# Patient Record
Sex: FEMALE | Race: WHITE | Employment: STUDENT | URBAN - METROPOLITAN AREA
[De-identification: names, ages, dates, MRNs, and addresses within clinical notes are randomized per-mention and may not be internally consistent; named-entity substitution may affect disease eponyms.]

---

## 2017-03-31 ENCOUNTER — OFFICE VISIT (OUTPATIENT)
Dept: OPHTHALMOLOGY | Facility: CLINIC | Age: 22
End: 2017-03-31

## 2017-03-31 DIAGNOSIS — H04.129 DRY EYE SYNDROME OF UNSPECIFIED LACRIMAL GLAND: ICD-10-CM

## 2017-03-31 DIAGNOSIS — S04.52XS: ICD-10-CM

## 2017-03-31 DIAGNOSIS — G51.0 FACIAL NERVE PALSY: Primary | ICD-10-CM

## 2017-03-31 ASSESSMENT — VISUAL ACUITY
OS_PH_SC: 20/25+2
OD_SC: 20/20
OS_SC: 20/40
METHOD: SNELLEN - LINEAR

## 2017-03-31 ASSESSMENT — CONF VISUAL FIELD
OS_NORMAL: 1
OD_NORMAL: 1
METHOD: COUNTING FINGERS

## 2017-03-31 ASSESSMENT — EXTERNAL EXAM - LEFT EYE: OS_EXAM: 1+ BROW PTOSIS

## 2017-03-31 ASSESSMENT — TONOMETRY
OD_IOP_MMHG: 15
IOP_METHOD: TONOPEN
OS_IOP_MMHG: 12

## 2017-03-31 ASSESSMENT — MARGIN REFLEX DISTANCE
OS_MRD1: 4
OD_MRD1: 4

## 2017-03-31 ASSESSMENT — SLIT LAMP EXAM - LIDS
COMMENTS: NORMAL
COMMENTS: NORMAL

## 2017-03-31 ASSESSMENT — EXTERNAL EXAM - RIGHT EYE: OD_EXAM: NORMAL

## 2017-03-31 NOTE — MR AVS SNAPSHOT
After Visit Summary   3/31/2017    Mai Montana    MRN: [id number]           Patient Information     Date Of Birth          1995        Visit Information        Provider Department      3/31/2017 8:00 AM Nabil Tomlin MD [facility] Ophthalmology        Today's Diagnoses     Facial nerve palsy    -  1    Traumatic facial neuropathy, left, sequela        Dry eye syndrome of unspecified lacrimal gland - Left Eye           Follow-ups after your visit        Follow-up notes from your care team     Return in about 3 months (around 2017) for RETURN OCULOPLASTICS.      Who to contact     Please call your clinic at [phone] to:    Ask questions about your health    Make or cancel appointments    Discuss your medicines    Learn about your test results    Speak to your doctor   If you have compliments or concerns about an experience at your clinic, or if you wish to file a complaint, please contact [facility] Patient Relations at [phone] or email us at [email or web address]         Additional Information About Your Visit        MyChart Information     [employer] is an electronic gateway that provides easy, online access to your medical records. With [employer], you can request a clinic appointment, read your test results, renew a prescription or communicate with your care team.     To sign up for [employer] visit the website at [email or web address]   You will be asked to enter the access code listed below, as well as some personal information. Please follow the directions to create your username and password.     Your access code is: [email or web address]  Expires: 2017  6:30 AM     Your access code will  in 90 days. If you need help or a new code, please contact your [facility] or call [phone] for assistance.        Care EveryWhere ID     This is your Care EveryWhere ID. This could be used by other organizations to  access your [place] medical records  [id number]         Blood Pressure from Last 3 Encounters:   04/17/14 100/48    Weight from Last 3 Encounters:   04/17/14 76.7 kg (169 lb) (94 %)*     * Growth percentiles are based on [facility] 2-20 Years data.              Today, you had the following     No orders found for display       Primary Care Provider Office Phone # Fax #    [facility] [phone]       [facility] [address]        Thank you!     Thank you for choosing [facility] OPHTHALMOLOGY  for your care. Our goal is always to provide you with excellent care. Hearing back from our patients is one way we can continue to improve our services. Please take a few minutes to complete the written survey that you may receive in the mail after your visit with us. Thank you!             Your Updated Medication List - Protect others around you: Learn how to safely use, store and throw away your medicines at www.disposemymeds.org.      Notice  As of 3/31/2017  9:17 AM    You have not been prescribed any medications.

## 2017-03-31 NOTE — NURSING NOTE
Chief Complaints and History of Present Illnesses   Patient presents with     Post Op (Ophthalmology) Left Eye     STEPHANIE ptosis repair by MMCR and left severing of tarso     HPI    Affected eye(s):  Left   Symptoms:     Blurred vision   No floaters   No flashes   Redness (Comment: sometimes per pt)   Foreign body sensation (Comment: irritation per pt)   No tearing (Comment: patient notes that she has no tearing at all in the LE)   Dryness   No itching   No burning         Do you have eye pain now?:  No      Comments:  Patient notes she moved to [place] for school in August 2016 and states that is when she noticed her LE starting to become very dry    Patient denies regular gtts use for relief of dryness    Brenda Ribeiro March 31, 2017 8:03 AM

## 2017-03-31 NOTE — PROGRESS NOTES
Chief Complaints and History of Present Illnesses   Patient presents with     Post Op (Ophthalmology) Left Eye     STEPHANIE ptosis repair by MMCR and left severing of tarso 02/2016     Dry Eye Left Eye      Notes increasing symptoms left eye since being in [place].         Assessment & Plan     Mai Montana is a 21 year old female with the following diagnoses:   1. Facial nerve palsy    2. Traumatic facial neuropathy, left, sequela    3. Dry eye syndrome of unspecified lacrimal gland - Left Eye       Artificial tears four times a day   Fish oil 1000mg daily  Ointment at bedtime       Attending Physician Attestation:  I have seen and examined this patient.  I have confirmed and edited as necessary the chief complaint(s), history of present illness, review of systems, relevant history, and examination findings as documented by others.  I have personally reviewed the relevant tests, images, and reports as documented above.  I have confirmed and edited as necessary the assessment and plan and agree with this note.    - Nabil Tomlin MD 9:07 AM 3/31/2017

## 2017-07-10 ENCOUNTER — OFFICE VISIT (OUTPATIENT)
Dept: OPHTHALMOLOGY | Facility: CLINIC | Age: 22
End: 2017-07-10

## 2017-07-10 DIAGNOSIS — G51.0 FACIAL NERVE PALSY: Primary | ICD-10-CM

## 2017-07-10 DIAGNOSIS — H04.129 DRY EYE SYNDROME OF UNSPECIFIED LACRIMAL GLAND: ICD-10-CM

## 2017-07-10 DIAGNOSIS — H02.412 MECHANICAL PTOSIS, LEFT: ICD-10-CM

## 2017-07-10 ASSESSMENT — VISUAL ACUITY
OD_SC: 20/15
OS_SC+: -2
METHOD: SNELLEN - LINEAR
OS_PH_SC: 20/25
OS_SC: 20/40

## 2017-07-10 ASSESSMENT — CONF VISUAL FIELD
OD_NORMAL: 1
METHOD: COUNTING FINGERS
OS_NORMAL: 1

## 2017-07-10 ASSESSMENT — EXTERNAL EXAM - RIGHT EYE: OD_EXAM: NORMAL

## 2017-07-10 ASSESSMENT — SLIT LAMP EXAM - LIDS
COMMENTS: NORMAL
COMMENTS: NORMAL

## 2017-07-10 ASSESSMENT — MARGIN REFLEX DISTANCE
OD_MRD1: 4
OS_MRD1: 4

## 2017-07-10 ASSESSMENT — TONOMETRY
OS_IOP_MMHG: 14
IOP_METHOD: ICARE
OD_IOP_MMHG: 16

## 2017-07-10 ASSESSMENT — EXTERNAL EXAM - LEFT EYE: OS_EXAM: 1+ BROW PTOSIS

## 2017-07-10 NOTE — PROGRESS NOTES
Chief Complaints and History of Present Illnesses   Patient presents with     Follow Up For     Facial nerve palsy; traumatic facial neuropathy     Post Op (Ophthalmology) Left Eye     s/p STEPHANIE ptosis repair by MMCR and left severing of tarso     Doing fine         Assessment & Plan     Mai Montana is a 21 year old female with the following diagnoses:   1. Facial nerve palsy    2. Dry eye syndrome of unspecified lacrimal gland - Left Eye    3. Mechanical ptosis, left       Stable  Discussed possibility of ctl left eye if she wants in the future.   Return to clinic 1 year       Attending Physician Attestation:  I have seen and examined this patient.  I have confirmed and edited as necessary the chief complaint(s), history of present illness, review of systems, relevant history, and examination findings as documented by others.  I have personally reviewed the relevant tests, images, and reports as documented above.  I have confirmed and edited as necessary the assessment and plan and agree with this note.    - Nabil Tomlin MD 7:56 AM 7/10/2017

## 2017-07-10 NOTE — NURSING NOTE
Chief Complaints and History of Present Illnesses   Patient presents with     Follow Up For     Facial nerve palsy; traumatic facial neuropathy     Post Op (Ophthalmology) Left Eye     s/p STEPHANIE ptosis repair by MMCR and left severing of tarso     HPI    Affected eye(s):  Both   Symptoms:     No blurred vision   No floaters   No flashes         Do you have eye pain now?:  No      Comments:  Patient notes she has no new concerns today, feels that the Left lid is 'good', uses a pm cream and gtts for dryness.     Brenda Ribeiro July 10, 2017 7:35 AM

## 2017-07-10 NOTE — MR AVS SNAPSHOT
After Visit Summary   7/10/2017    Mai Montana    MRN: [id number]           Patient Information     Date Of Birth          1995        Visit Information        Provider Department      7/10/2017 7:45 AM Nabil Tomlin MD [facility] Ophthalmology        Today's Diagnoses     Facial nerve palsy    -  1    Dry eye syndrome of unspecified lacrimal gland - Left Eye        Mechanical ptosis, left           Follow-ups after your visit        Follow-up notes from your care team     Return in about 1 year (around 7/10/2018) for RETURN OCULOPLASTICS.      Who to contact     Please call your clinic at [phone] to:    Ask questions about your health    Make or cancel appointments    Discuss your medicines    Learn about your test results    Speak to your doctor   If you have compliments or concerns about an experience at your clinic, or if you wish to file a complaint, please contact [facility] Patient Relations at [phone] or email us at [email or web address]         Additional Information About Your Visit        MyChart Information     "[employer] is an electronic gateway that provides easy, online access to your medical records. With "[employer] you can request a clinic appointment, read your test results, renew a prescription or communicate with your care team.     To sign up for [employer] visit the website at [email or web address]   You will be asked to enter the access code listed below, as well as some personal information. Please follow the directions to create your username and password.     Your access code is: [email or web address]  Expires: 2017  6:30 AM     Your access code will  in 90 days. If you need help or a new code, please contact your [facility] or call [phone] for assistance.        Care EveryWhere ID     This is your Care EveryWhere ID. This could be used by other organizations to access your [place]  medical records  [id number]         Blood Pressure from Last 3 Encounters:   04/17/14 100/48    Weight from Last 3 Encounters:   04/17/14 76.7 kg (169 lb) (94 %)*     * Growth percentiles are based on CDC 2-20 Years data.              Today, you had the following     No orders found for display       Primary Care Provider Office Phone # Fax #    [facility] [phone] [address] [facility] [address]        Equal Access to Services     ABDULKADIR ROB : Hadii aad ku hadasho Soomaali, waaxda luqadaha, qaybta kaalmada adeegyada, waxay idiin hayaan melissa minor. So [facility] [phone].    ATENCIÓN: Si habla español, tiene a antony disposición servicios gratuitos de asistencia lingüística. Llame al [phone].    We comply with applicable federal civil rights laws and [place] laws. We do not discriminate on the basis of race, color, national origin, age, disability sex, sexual orientation or gender identity.            Thank you!     Thank you for choosing [facility] OPHTHALMOLOGY  for your care. Our goal is always to provide you with excellent care. Hearing back from our patients is one way we can continue to improve our services. Please take a few minutes to complete the written survey that you may receive in the mail after your visit with us. Thank you!             Your Updated Medication List - Protect others around you: Learn how to safely use, store and throw away your medicines at www.disposemymeds.org.          This list is accurate as of: 7/10/17  7:57 AM.  Always use your most recent med list.                   Brand Name Dispense Instructions for use Diagnosis    ARTIFICIAL TEARS OP

## 2017-08-03 ENCOUNTER — TELEPHONE (OUTPATIENT)
Dept: OPHTHALMOLOGY | Facility: CLINIC | Age: 22
End: 2017-08-03

## 2017-08-03 NOTE — TELEPHONE ENCOUNTER
----- Message from Elijah Smith RN sent at 8/2/2017  4:56 PM CDT -----  Regarding: FW: Pt requesting Rx for glasses from Dr. Tomlin, pt saw Dr. CASTAÑEDA a month ago,...  Contact: [phone]   No callback sine messages left yesterday  No Rx in system-- no prior eye glass wear  Please help with new glasses Rx (? [place] with polycarb lenses)  Thank you  Elijah Smith RN 4:58 PM 08/02/17    ----- Message -----     From: Elijah Smith RN     Sent: 8/2/2017  10:06 AM       To: Eye Triage[facility]  Subject: FW: Pt requesting Rx for glasses from Dr. Scott#     Left message with direct number at [phone]  Elijah Smith RN 10:07 AM 08/02/17  No Rx in system-- no prior eye glass wear    ----- Message -----     From: Nannette Wiggins     Sent: 8/1/2017   5:27 PM       To: Eye Triage[facility]  Subject: Pt requesting Rx for glasses from Dr. Jha#    And he said that she could call and request Rx if she wanted it.  Please mail Rx to her residence.    Please call pt at [phone], if need be.    Thank you,  Giacomo WHARTON    Please DO NOT send this message and/or reply back to sender.  Call Center Representatives DO NOT respond to messages.

## 2017-08-03 NOTE — TELEPHONE ENCOUNTER
Left a voicemail for Mai asking what type of glasses it is she would like. She is not monocular, but has had improved acuity in one eye with pinhole, suggesting she may have better vision with glasses in that eye. I asked her to call us back to discuss. She has never been refracted here previously.

## 2017-08-09 ENCOUNTER — HOSPITAL ENCOUNTER (OUTPATIENT)
Facility: CLINIC | Age: 22
Discharge: HOME | End: 2017-08-09
Attending: ORTHOPAEDIC SURGERY | Admitting: ORTHOPAEDIC SURGERY
Payer: COMMERCIAL

## 2017-08-09 ENCOUNTER — SURGERY (OUTPATIENT)
Age: 22
End: 2017-08-09

## 2017-08-09 ENCOUNTER — ANESTHESIA EVENT (OUTPATIENT)
Dept: SURGERY | Facility: CLINIC | Age: 22
End: 2017-08-09
Payer: COMMERCIAL

## 2017-08-09 ENCOUNTER — ANESTHESIA (OUTPATIENT)
Dept: SURGERY | Facility: CLINIC | Age: 22
End: 2017-08-09
Payer: COMMERCIAL

## 2017-08-09 VITALS
OXYGEN SATURATION: 97 % | DIASTOLIC BLOOD PRESSURE: 59 MMHG | TEMPERATURE: 97.9 F | SYSTOLIC BLOOD PRESSURE: 94 MMHG | RESPIRATION RATE: 18 BRPM | HEART RATE: 93 BPM | BODY MASS INDEX: 28.13 KG/M2 | WEIGHT: 185.63 LBS | HEIGHT: 68 IN

## 2017-08-09 DIAGNOSIS — S83.212D BUCKET-HANDLE TEAR OF MEDIAL MENISCUS OF LEFT KNEE AS CURRENT INJURY, SUBSEQUENT ENCOUNTER: Primary | ICD-10-CM

## 2017-08-09 LAB — HCG UR QL: NEGATIVE

## 2017-08-09 PROCEDURE — 71000014 ZZH RECOVERY PHASE 1 LEVEL 2 FIRST HR: Performed by: ORTHOPAEDIC SURGERY

## 2017-08-09 PROCEDURE — 27210794 ZZH OR GENERAL SUPPLY STERILE: Performed by: ORTHOPAEDIC SURGERY

## 2017-08-09 PROCEDURE — 25000128 H RX IP 250 OP 636: Performed by: NURSE ANESTHETIST, CERTIFIED REGISTERED

## 2017-08-09 PROCEDURE — 25000125 ZZHC RX 250: Performed by: ORTHOPAEDIC SURGERY

## 2017-08-09 PROCEDURE — 36000059 ZZH SURGERY LEVEL 3 EA 15 ADDTL MIN UMMC: Performed by: ORTHOPAEDIC SURGERY

## 2017-08-09 PROCEDURE — 25000566 ZZH SEVOFLURANE, EA 15 MIN: Performed by: ORTHOPAEDIC SURGERY

## 2017-08-09 PROCEDURE — 25000125 ZZHC RX 250: Performed by: NURSE ANESTHETIST, CERTIFIED REGISTERED

## 2017-08-09 PROCEDURE — C9290 INJ, BUPIVACAINE LIPOSOME: HCPCS | Performed by: ANESTHESIOLOGY

## 2017-08-09 PROCEDURE — 37000009 ZZH ANESTHESIA TECHNICAL FEE, EACH ADDTL 15 MIN: Performed by: ORTHOPAEDIC SURGERY

## 2017-08-09 PROCEDURE — 81025 URINE PREGNANCY TEST: CPT | Performed by: ANESTHESIOLOGY

## 2017-08-09 PROCEDURE — 25000128 H RX IP 250 OP 636: Performed by: ORTHOPAEDIC SURGERY

## 2017-08-09 PROCEDURE — 25000128 H RX IP 250 OP 636: Performed by: ANESTHESIOLOGY

## 2017-08-09 PROCEDURE — 37000008 ZZH ANESTHESIA TECHNICAL FEE, 1ST 30 MIN: Performed by: ORTHOPAEDIC SURGERY

## 2017-08-09 PROCEDURE — 36000057 ZZH SURGERY LEVEL 3 1ST 30 MIN - UMMC: Performed by: ORTHOPAEDIC SURGERY

## 2017-08-09 PROCEDURE — 27210995 ZZH RX 272: Performed by: ORTHOPAEDIC SURGERY

## 2017-08-09 PROCEDURE — 40000170 ZZH STATISTIC PRE-PROCEDURE ASSESSMENT II: Performed by: ORTHOPAEDIC SURGERY

## 2017-08-09 PROCEDURE — 71000027 ZZH RECOVERY PHASE 2 EACH 15 MINS: Performed by: ORTHOPAEDIC SURGERY

## 2017-08-09 RX ORDER — ONDANSETRON 2 MG/ML
4 INJECTION INTRAMUSCULAR; INTRAVENOUS EVERY 30 MIN PRN
Status: DISCONTINUED | OUTPATIENT
Start: 2017-08-09 | End: 2017-08-09 | Stop reason: HOSPADM

## 2017-08-09 RX ORDER — LIDOCAINE HYDROCHLORIDE 20 MG/ML
INJECTION, SOLUTION INFILTRATION; PERINEURAL PRN
Status: DISCONTINUED | OUTPATIENT
Start: 2017-08-09 | End: 2017-08-09

## 2017-08-09 RX ORDER — HYDROXYZINE HYDROCHLORIDE 25 MG/1
25 TABLET, FILM COATED ORAL EVERY 6 HOURS PRN
Qty: 30 TABLET | Refills: 0 | Status: SHIPPED | OUTPATIENT
Start: 2017-08-09 | End: 2017-12-22

## 2017-08-09 RX ORDER — GLYCOPYRROLATE 0.2 MG/ML
INJECTION, SOLUTION INTRAMUSCULAR; INTRAVENOUS PRN
Status: DISCONTINUED | OUTPATIENT
Start: 2017-08-09 | End: 2017-08-09

## 2017-08-09 RX ORDER — SODIUM CHLORIDE, SODIUM LACTATE, POTASSIUM CHLORIDE, CALCIUM CHLORIDE 600; 310; 30; 20 MG/100ML; MG/100ML; MG/100ML; MG/100ML
INJECTION, SOLUTION INTRAVENOUS CONTINUOUS PRN
Status: DISCONTINUED | OUTPATIENT
Start: 2017-08-09 | End: 2017-08-09

## 2017-08-09 RX ORDER — DEXAMETHASONE SODIUM PHOSPHATE 4 MG/ML
INJECTION, SOLUTION INTRA-ARTICULAR; INTRALESIONAL; INTRAMUSCULAR; INTRAVENOUS; SOFT TISSUE PRN
Status: DISCONTINUED | OUTPATIENT
Start: 2017-08-09 | End: 2017-08-09

## 2017-08-09 RX ORDER — KETOROLAC TROMETHAMINE 30 MG/ML
INJECTION, SOLUTION INTRAMUSCULAR; INTRAVENOUS PRN
Status: DISCONTINUED | OUTPATIENT
Start: 2017-08-09 | End: 2017-08-09

## 2017-08-09 RX ORDER — NALOXONE HYDROCHLORIDE 0.4 MG/ML
.1-.4 INJECTION, SOLUTION INTRAMUSCULAR; INTRAVENOUS; SUBCUTANEOUS
Status: DISCONTINUED | OUTPATIENT
Start: 2017-08-09 | End: 2017-08-09 | Stop reason: HOSPADM

## 2017-08-09 RX ORDER — OXYCODONE HYDROCHLORIDE 5 MG/1
5-10 TABLET ORAL EVERY 4 HOURS PRN
Qty: 40 TABLET | Refills: 0 | Status: SHIPPED | OUTPATIENT
Start: 2017-08-09 | End: 2017-12-22

## 2017-08-09 RX ORDER — MEPERIDINE HYDROCHLORIDE 25 MG/ML
12.5 INJECTION INTRAMUSCULAR; INTRAVENOUS; SUBCUTANEOUS
Status: DISCONTINUED | OUTPATIENT
Start: 2017-08-09 | End: 2017-08-09 | Stop reason: HOSPADM

## 2017-08-09 RX ORDER — ONDANSETRON 4 MG/1
4-8 TABLET, ORALLY DISINTEGRATING ORAL EVERY 8 HOURS PRN
Qty: 4 TABLET | Refills: 0 | Status: SHIPPED | OUTPATIENT
Start: 2017-08-09 | End: 2017-12-22

## 2017-08-09 RX ORDER — BUPIVACAINE HYDROCHLORIDE AND EPINEPHRINE 2.5; 5 MG/ML; UG/ML
INJECTION, SOLUTION INFILTRATION; PERINEURAL PRN
Status: DISCONTINUED | OUTPATIENT
Start: 2017-08-09 | End: 2017-08-09 | Stop reason: HOSPADM

## 2017-08-09 RX ORDER — AMOXICILLIN 250 MG
1-2 CAPSULE ORAL 2 TIMES DAILY
Qty: 30 TABLET | Refills: 0 | Status: SHIPPED | OUTPATIENT
Start: 2017-08-09 | End: 2017-12-22

## 2017-08-09 RX ORDER — CEFAZOLIN SODIUM 1 G/3ML
1 INJECTION, POWDER, FOR SOLUTION INTRAMUSCULAR; INTRAVENOUS SEE ADMIN INSTRUCTIONS
Status: DISCONTINUED | OUTPATIENT
Start: 2017-08-09 | End: 2017-08-09 | Stop reason: HOSPADM

## 2017-08-09 RX ORDER — PROPOFOL 10 MG/ML
INJECTION, EMULSION INTRAVENOUS PRN
Status: DISCONTINUED | OUTPATIENT
Start: 2017-08-09 | End: 2017-08-09

## 2017-08-09 RX ORDER — LIDOCAINE 40 MG/G
CREAM TOPICAL
Status: DISCONTINUED | OUTPATIENT
Start: 2017-08-09 | End: 2017-08-09 | Stop reason: HOSPADM

## 2017-08-09 RX ORDER — ONDANSETRON 4 MG/1
4 TABLET, ORALLY DISINTEGRATING ORAL EVERY 30 MIN PRN
Status: DISCONTINUED | OUTPATIENT
Start: 2017-08-09 | End: 2017-08-09 | Stop reason: HOSPADM

## 2017-08-09 RX ORDER — HYDROMORPHONE HYDROCHLORIDE 1 MG/ML
.3-.5 INJECTION, SOLUTION INTRAMUSCULAR; INTRAVENOUS; SUBCUTANEOUS EVERY 10 MIN PRN
Status: DISCONTINUED | OUTPATIENT
Start: 2017-08-09 | End: 2017-08-09 | Stop reason: HOSPADM

## 2017-08-09 RX ORDER — SODIUM CHLORIDE, SODIUM LACTATE, POTASSIUM CHLORIDE, CALCIUM CHLORIDE 600; 310; 30; 20 MG/100ML; MG/100ML; MG/100ML; MG/100ML
INJECTION, SOLUTION INTRAVENOUS CONTINUOUS
Status: DISCONTINUED | OUTPATIENT
Start: 2017-08-09 | End: 2017-08-09 | Stop reason: HOSPADM

## 2017-08-09 RX ORDER — FENTANYL CITRATE 50 UG/ML
INJECTION, SOLUTION INTRAMUSCULAR; INTRAVENOUS PRN
Status: DISCONTINUED | OUTPATIENT
Start: 2017-08-09 | End: 2017-08-09

## 2017-08-09 RX ORDER — CEFAZOLIN SODIUM 2 G/100ML
2 INJECTION, SOLUTION INTRAVENOUS
Status: DISCONTINUED | OUTPATIENT
Start: 2017-08-09 | End: 2017-08-09 | Stop reason: HOSPADM

## 2017-08-09 RX ORDER — ONDANSETRON 2 MG/ML
INJECTION INTRAMUSCULAR; INTRAVENOUS PRN
Status: DISCONTINUED | OUTPATIENT
Start: 2017-08-09 | End: 2017-08-09

## 2017-08-09 RX ORDER — ACETAMINOPHEN 325 MG/1
650 TABLET ORAL EVERY 4 HOURS PRN
Qty: 100 TABLET | Refills: 0 | Status: SHIPPED | OUTPATIENT
Start: 2017-08-09

## 2017-08-09 RX ADMIN — HYDROMORPHONE HYDROCHLORIDE 0.5 MG: 1 INJECTION, SOLUTION INTRAMUSCULAR; INTRAVENOUS; SUBCUTANEOUS at 10:12

## 2017-08-09 RX ADMIN — SODIUM CHLORIDE, POTASSIUM CHLORIDE, SODIUM LACTATE AND CALCIUM CHLORIDE: 600; 310; 30; 20 INJECTION, SOLUTION INTRAVENOUS at 09:00

## 2017-08-09 RX ADMIN — CEFAZOLIN 2 G: 1 INJECTION, POWDER, FOR SOLUTION INTRAMUSCULAR; INTRAVENOUS at 08:39

## 2017-08-09 RX ADMIN — HYDROMORPHONE HYDROCHLORIDE 0.5 MG: 1 INJECTION, SOLUTION INTRAMUSCULAR; INTRAVENOUS; SUBCUTANEOUS at 10:15

## 2017-08-09 RX ADMIN — BUPIVACAINE HYDROCHLORIDE AND EPINEPHRINE BITARTRATE 15 ML: 2.5; .005 INJECTION, SOLUTION INFILTRATION; PERINEURAL at 08:47

## 2017-08-09 RX ADMIN — Medication 0.2 MG: at 08:42

## 2017-08-09 RX ADMIN — SODIUM CHLORIDE, POTASSIUM CHLORIDE, SODIUM LACTATE AND CALCIUM CHLORIDE: 600; 310; 30; 20 INJECTION, SOLUTION INTRAVENOUS at 07:29

## 2017-08-09 RX ADMIN — BUPIVACAINE HYDROCHLORIDE AND EPINEPHRINE BITARTRATE 10 ML: 2.5; .005 INJECTION, SOLUTION INFILTRATION; PERINEURAL at 11:19

## 2017-08-09 RX ADMIN — MIDAZOLAM HYDROCHLORIDE 2 MG: 1 INJECTION, SOLUTION INTRAMUSCULAR; INTRAVENOUS at 08:18

## 2017-08-09 RX ADMIN — FENTANYL CITRATE 50 MCG: 50 INJECTION, SOLUTION INTRAMUSCULAR; INTRAVENOUS at 09:23

## 2017-08-09 RX ADMIN — BUPIVACAINE 10 ML: 13.3 INJECTION, SUSPENSION, LIPOSOMAL INFILTRATION at 11:19

## 2017-08-09 RX ADMIN — FENTANYL CITRATE 50 MCG: 50 INJECTION, SOLUTION INTRAMUSCULAR; INTRAVENOUS at 09:45

## 2017-08-09 RX ADMIN — DEXAMETHASONE SODIUM PHOSPHATE 6 MG: 4 INJECTION, SOLUTION INTRAMUSCULAR; INTRAVENOUS at 08:44

## 2017-08-09 RX ADMIN — PROPOFOL 200 MG: 10 INJECTION, EMULSION INTRAVENOUS at 08:29

## 2017-08-09 RX ADMIN — FENTANYL CITRATE 50 MCG: 50 INJECTION, SOLUTION INTRAMUSCULAR; INTRAVENOUS at 08:29

## 2017-08-09 RX ADMIN — PROPOFOL 60 MG: 10 INJECTION, EMULSION INTRAVENOUS at 08:47

## 2017-08-09 RX ADMIN — ONDANSETRON 4 MG: 2 INJECTION INTRAMUSCULAR; INTRAVENOUS at 09:49

## 2017-08-09 RX ADMIN — KETOROLAC TROMETHAMINE 30 MG: 30 INJECTION, SOLUTION INTRAMUSCULAR at 09:51

## 2017-08-09 RX ADMIN — EPINEPHRINE 10000 ML: 1 INJECTION, SOLUTION, CONCENTRATE INTRAVENOUS at 09:32

## 2017-08-09 RX ADMIN — LIDOCAINE HYDROCHLORIDE 60 MG: 20 INJECTION, SOLUTION INFILTRATION; PERINEURAL at 08:29

## 2017-08-09 RX ADMIN — FENTANYL CITRATE 50 MCG: 50 INJECTION, SOLUTION INTRAMUSCULAR; INTRAVENOUS at 08:46

## 2017-08-09 NOTE — ANESTHESIA POSTPROCEDURE EVALUATION
Patient: Mai Montana    Procedure(s):  Left Knee Arthroscopy, Medial Meniscal Repair - Wound Class: I-Clean    Diagnosis:Left Medial Meniscal Tear   Diagnosis Additional Information: No value filed.    Anesthesia Type:  General, LMA    Note:  Anesthesia Post Evaluation    Patient location during evaluation: PACU  Patient participation: Able to fully participate in evaluation  Level of consciousness: awake and alert  Pain management: adequate  Airway patency: patent  Cardiovascular status: acceptable  Respiratory status: acceptable  Hydration status: acceptable  PONV: none             Last vitals:  Vitals:    08/09/17 1030 08/09/17 1045 08/09/17 1100   BP: 116/71 106/67 93/52   Pulse:      Resp: 20 11 17   Temp: 36.7  C (98.1  F)  37.2  C (98.9  F)   SpO2: 99% 96% 95%         Electronically Signed By: Gwen Amezcua MD  August 9, 2017  11:18 AM

## 2017-08-09 NOTE — ANESTHESIA CARE TRANSFER NOTE
Patient: Mai Montana    Procedure(s):  Left Knee Arthroscopy, Medial Meniscal Repair - Wound Class: I-Clean    Diagnosis: Left Medial Meniscal Tear   Diagnosis Additional Information: No value filed.    Anesthesia Type:   General, LMA     Note:  Airway :Face Mask  Patient transferred to:PACU  Comments: Pt. Color pink, spontaneous respirations. Report to RN.      Vitals: (Last set prior to Anesthesia Care Transfer)    CRNA VITALS  8/9/2017 0935 - 8/9/2017 1018      8/9/2017             Resp Rate (set): 10                Electronically Signed By: ROMEO Hart CRNA  August 9, 2017  10:18 AM

## 2017-08-09 NOTE — BRIEF OP NOTE
[facility], [place]    Orthopaedic Surgery  Brief Operative Note    Pre-operative diagnosis: Left Medial Meniscal Tear    Post-operative diagnosis Left Bucket-handle Medial Meniscal Tear   Procedure: Procedure(s):  Left Knee Arthroscopy, Medial Meniscal Repair vs Meniscectomy  - Wound Class: I-Clean  Left Knee Arthroscopy, Medial Meniscal Repair - Wound Class: I-Clean   Surgeon: Maynor Silvestre MD   Assistants(s): Dylan David   Anesthesia: General    Estimated blood loss: Less than 10 ml   Total IV fluids: (See anesthesia record)   Blood transfusion: (See anesthesia record)   Total urine output: (See anesthesia record)   Drains: None   Specimens: * No specimens in log *   Findings: None   Complications: None   Implants: None

## 2017-08-09 NOTE — OR NURSING
Meets phase two criteria     VSS pain mild    Reviewed discharge instructions with pt and mother.   Voiced understanding    Reviewed crutch gait . Pt has own crutches and size check for appropriate adjustment.  Stated feels ready to go home    no c/o

## 2017-08-09 NOTE — OR NURSING
Arrived PACU awake and talking.  Breath sounds clear to bases heard anterior chest.  Moves all fours.  Denies numbness or tingling.  Left foot warm and pink with 2+ pedal and posterior tibial pulses.  Dressings dry and intact.  Left leg in hinged brace in full extension locked position.  Left leg elevated on 2 pillows.

## 2017-08-09 NOTE — ANESTHESIA PREPROCEDURE EVALUATION
Anesthesia Evaluation     . Pt has had prior anesthetic. Type: General    No history of anesthetic complications          ROS/MED HX    ENT/Pulmonary:  - neg pulmonary ROS     Neurologic:  - neg neurologic ROS     Cardiovascular:  - neg cardiovascular ROS       METS/Exercise Tolerance:  >4 METS   Hematologic:  - neg hematologic  ROS       Musculoskeletal:         GI/Hepatic:  - neg GI/hepatic ROS       Renal/Genitourinary:  - ROS Renal section negative       Endo:         Psychiatric:         Infectious Disease:         Malignancy:         Other:                     Physical Exam  Normal systems: dental    Airway   Mallampati: I  Neck ROM: full    Dental     Cardiovascular   Rhythm and rate: regular and normal      Pulmonary    breath sounds clear to auscultation                    Anesthesia Plan      History & Physical Review  History and physical reviewed and following examination; no interval change.    ASA Status:  1 .    NPO Status:  > 8 hours    Plan for General and LMA with Intravenous induction. Maintenance will be Inhalation.    PONV prophylaxis:  Ondansetron (or other 5HT-3) and Dexamethasone or Solumedrol       Postoperative Care  Postoperative pain management:  IV analgesics.      Consents  Anesthetic plan, risks, benefits and alternatives discussed with:  Patient..                          .

## 2017-08-09 NOTE — ADDENDUM NOTE
Addendum  created 08/09/17 1135 by Ayde De Dios MD    Anesthesia Attestations filed, Anesthesia Intra Blocks edited, Anesthesia Intra Meds edited, Child order released for a procedure order, Sign clinical note

## 2017-08-09 NOTE — IP AVS SNAPSHOT
[place] [address] [facility] [address]    Phone:  [phone]                                       After Visit Summary   8/9/2017    Mai Montana    MRN: [id number]           After Visit Summary Signature Page     I have received my discharge instructions, and my questions have been answered. I have discussed any challenges I see with this plan with the nurse or doctor.    ..........................................................................................................................................  Patient/Patient Representative Signature      ..........................................................................................................................................  Patient Representative Print Name and Relationship to Patient    ..................................................               ................................................  Date                                            Time    ..........................................................................................................................................  Reviewed by Signature/Title    ...................................................              ..............................................  Date                                                            Time

## 2017-08-09 NOTE — ANESTHESIA PROCEDURE NOTES
Peripheral Nerve Block Procedure Note    Staff:     Anesthesiologist:  AYDE DEE  Location: PACU  Procedure Start/Stop TImes:      8/9/2017 11:17 AM     8/9/2017 11:20 AM    patient identified, IV checked, site marked, risks and benefits discussed, informed consent, monitors and equipment checked, pre-op evaluation, at physician/surgeon's request and post-op pain management      Correct Patient: Yes      Correct Position: Yes      Correct Site: Yes      Correct Procedure: Yes      Correct Laterality:  Yes    Site Marked:  Yes  Procedure details:     Procedure:  Adductor canal    Laterality:  Left    Position:  Supine    Sterile Prep: chloraprep, alcohol swabs, mask and sterile gloves      Needle:  Insulated and short bevel    Needle gauge:  21    Needle length (inches):  4    Ultrasound: Yes      Ultrasound used to identify targeted nerve, plexus, or vascular structure and placed a needle adjacent to it      Permanent Image entered into patiient's record      Abnormal pain on injection: No      Blood Aspirated: No      Paresthesias:  No    Bleeding at site: No      Bolus via:  Needle    Infusion Method:  Single Shot    Complications:  None  Assessment/Narrative:     Injection made incrementally with aspirations every (mL):  5     Discussed with Patient Off-Label use of Liposomal Bupivacaine (Exparel) for Nerve Block.    Relevant risks & benefits were discussed with patient.    All questions were answered and there was agreement to proceed.    Patient signed Off-Label Use of Exparel Consent Form.      Ayde Dee MD  August 9, 2017

## 2017-08-09 NOTE — OR NURSING
"Pt asking for \"that block to help pain\".  DR Pelaez here and placed adductor block with out any problem.  Pt remains awake and alert.  Vital signs stable.  "

## 2017-08-09 NOTE — DISCHARGE INSTRUCTIONS
Same-Day Surgery   Adult Discharge Orders & Instructions     For 24 hours after surgery:  1. Get plenty of rest.  A responsible adult must stay with you for at least 24 hours after you leave the hospital.   2. Pain medication can slow your reflexes. Do not drive or use heavy equipment.  If you have weakness or tingling, don't drive or use heavy equipment until this feeling goes away.  3. Mixing alcohol and pain medication can cause dizziness and slow your breathing. It can even be fatal. Do not drink alcohol while taking pain medication.  4. Avoid strenuous or risky activities.  Ask for help when climbing stairs.   5. You may feel lightheaded.  If so, sit for a few minutes before standing.  Have someone help you get up.   6. If you have nausea (feel sick to your stomach), drink only clear liquids such as apple juice, ginger ale, broth or 7-Up.  Rest may also help.  Be sure to drink enough fluids.  Move to a regular diet as you feel able. Take pain medications with a small amount of solid food, such as toast or crackers, to avoid nausea.   7. A slight fever is normal. Call the doctor if your fever is over 100 F (37.7 C) (taken under the tongue) or lasts longer than 24 hours.  8. You may have a dry mouth, muscle aches, trouble sleeping or a sore throat.  These symptoms should go away after 24 hours.  9. Do not make important or legal decisions.   Pain Management:      1. Take pain medication (if prescribed) for pain as directed by your physician.        2. WARNING: If the pain medication you have been prescribed contains Tylenol  (acetaminophen), DO NOT take additional doses of Tylenol (acetaminophen).     Call your doctor for any of the followin.  Signs of infection (fever, growing tenderness at the surgery site, severe pain, a large amount of drainage or bleeding, foul-smelling drainage, redness, swelling).    2.  It has been over 8 to 10 hours since surgery and you are still not able to urinate (pee).    3.   "Headache for over 24 hours.    4.  Numbness, tingling or weakness the day after surgery (if you had spinal anesthesia).  To contact a doctor, call _____________________________________ or:      [phone] and ask for the Resident On Call for:          __________________________________________ (answered 24 hours a day)      Emergency Department:  [facility]: [phone]  [facility]: [phone]               Rev. 10/2014   Safety Tips for Using Crutches    Crutch Fit:    Assume good standing posture with shoulders relaxed and crutch tips 6-8 inches out from the side of the foot.    The underarm pad should fall 2-3 fingers width below the armpit.    The handgrip is positioned level with the wrist to allow 30  flexion at the elbow.    Safety Tips:    Bear weight on your hands, not on your armpits.    Do not add extra padding to the underarm pad. This will, in effect, lengthen the crutches and increase risk of nerve injury.    Wear flat, properly fitting shoes. Do not walk in stocking feet, high heels or slippers.    Household hazards:  --Throw rugs should be removed from floors.  --Stairs should be cleared of obstacles.  --Use extra caution on slippery, highly polished, littered or uneven floor surfaces.  --Check for electric cords.    Check crutch tips for excessive wear and keep wing nuts tight.    While walking, look forward with  head up  and  eyes open.  Take equal length steps.    Use BOTH crutches.    Stairs Sequence:    UP: \"Good\" leg first, followed by  bad  leg, then crutches.    DOWN: Crutches, followed by  bad  leg, \"good\" leg.     Walking with Crutches:    Move both crutches forward at the same time.    Non-Weight Bearing (NWB):  Hold the involved leg up and swing through the crutches with the involved leg. The involved leg does not touch the floor.    Toe Touch Weight Bearing (TTWB): Move the involved leg forward. Rest it lightly on the floor for balance only. " Step through the crutches with the uninvolved leg.    Partial Weight Bearing (PWB): Move the involved leg forward. Step down the weight of the leg only.  Step through the crutches with the uninvolved leg.    Weight Bearing As Tolerated (WBAT): Move the involved leg forward. Put as much pressure through the involved leg as you can tolerate comfortably. Then step through the crutches with the uninvolved leg.    Rev. 4/2014

## 2017-08-09 NOTE — OR NURSING
"Pt remains awake and stable.  States \"pain is better and down to a discomfort\".  Tolerates PO.  Vital signs stable.  Meets discharge criteria.  "

## 2017-08-09 NOTE — IP AVS SNAPSHOT
MRN:[id number]                      After Visit Summary   8/9/2017    Mai Montana    MRN: [id number]           Thank you!     Thank you for choosing [facility] for your care. Our goal is always to provide you with excellent care. Hearing back from our patients is one way we can continue to improve our services. Please take a few minutes to complete the written survey that you may receive in the mail after you visit with us. Thank you!        Patient Information     Date Of Birth          1995        About your hospital stay     You were admitted on:  August 9, 2017 You last received care in the:  [facility] PACU    You were discharged on:  August 9, 2017       Who to Call     For medical emergencies, please call 911.  For non-urgent questions about your medical care, please call your primary care provider or clinic, [phone]  For questions related to your surgery, please call your surgery clinic        Attending Provider     Provider Specialty    Maynor Silvestre MD Orthopedics       Primary Care Provider Office Phone # Fax #    [facility] [phone]      After Care Instructions      Diet as Tolerated       Return to diet before surgery, unless instructed otherwise.            Discharge Instructions       Review outpatient procedure discharge instructions with patient as directed by Provider            Follow-up Physical Therapy appointment       Begin physical therapy 2-3 days after surgery.            Ice to affected area       Ice pack to surgical site every 15 minutes per hour for 24 hours            Remove dressing - at 48 hours       Redress incisions with band-aids, gauze, and tubi-.            Return to clinic       Return to clinic in 7-10 days.            Shower       May get incisions wet in the shower 5 days after surgery.            Weight bearing - As tolerated       Weight bearing as tolerated in full extension with brace locked.             Wound care       Do not immerse wound in water for 4 weeks.                  Further instructions from your care team       Same-Day Surgery   Adult Discharge Orders & Instructions     For 24 hours after surgery:  1. Get plenty of rest.  A responsible adult must stay with you for at least 24 hours after you leave the hospital.   2. Pain medication can slow your reflexes. Do not drive or use heavy equipment.  If you have weakness or tingling, don't drive or use heavy equipment until this feeling goes away.  3. Mixing alcohol and pain medication can cause dizziness and slow your breathing. It can even be fatal. Do not drink alcohol while taking pain medication.  4. Avoid strenuous or risky activities.  Ask for help when climbing stairs.   5. You may feel lightheaded.  If so, sit for a few minutes before standing.  Have someone help you get up.   6. If you have nausea (feel sick to your stomach), drink only clear liquids such as apple juice, ginger ale, broth or 7-Up.  Rest may also help.  Be sure to drink enough fluids.  Move to a regular diet as you feel able. Take pain medications with a small amount of solid food, such as toast or crackers, to avoid nausea.   7. A slight fever is normal. Call the doctor if your fever is over 100 F (37.7 C) (taken under the tongue) or lasts longer than 24 hours.  8. You may have a dry mouth, muscle aches, trouble sleeping or a sore throat.  These symptoms should go away after 24 hours.  9. Do not make important or legal decisions.   Pain Management:      1. Take pain medication (if prescribed) for pain as directed by your physician.        2. WARNING: If the pain medication you have been prescribed contains Tylenol  (acetaminophen), DO NOT take additional doses of Tylenol (acetaminophen).     Call your doctor for any of the followin.  Signs of infection (fever, growing tenderness at the surgery site, severe pain, a large amount of drainage or bleeding, foul-smelling  "drainage, redness, swelling).    2.  It has been over 8 to 10 hours since surgery and you are still not able to urinate (pee).    3.  Headache for over 24 hours.    4.  Numbness, tingling or weakness the day after surgery (if you had spinal anesthesia).  To contact a doctor, call _____________________________________ or:      [phone] and ask for the Resident On Call for:          __________________________________________ (answered 24 hours a day)      Emergency Department:  [facility]: [phone]  [facility]: [phone]               Rev. 10/2014   Safety Tips for Using Crutches    Crutch Fit:    Assume good standing posture with shoulders relaxed and crutch tips 6-8 inches out from the side of the foot.    The underarm pad should fall 2-3 fingers width below the armpit.    The handgrip is positioned level with the wrist to allow 30  flexion at the elbow.    Safety Tips:    Bear weight on your hands, not on your armpits.    Do not add extra padding to the underarm pad. This will, in effect, lengthen the crutches and increase risk of nerve injury.    Wear flat, properly fitting shoes. Do not walk in stocking feet, high heels or slippers.    Household hazards:  --Throw rugs should be removed from floors.  --Stairs should be cleared of obstacles.  --Use extra caution on slippery, highly polished, littered or uneven floor surfaces.  --Check for electric cords.    Check crutch tips for excessive wear and keep wing nuts tight.    While walking, look forward with  head up  and  eyes open.  Take equal length steps.    Use BOTH crutches.    Stairs Sequence:    UP: \"Good\" leg first, followed by  bad  leg, then crutches.    DOWN: Crutches, followed by  bad  leg, \"good\" leg.     Walking with Crutches:    Move both crutches forward at the same time.    Non-Weight Bearing (NWB):  Hold the involved leg up and swing through the crutches with the involved leg. The involved leg does " "not touch the floor.    Toe Touch Weight Bearing (TTWB): Move the involved leg forward. Rest it lightly on the floor for balance only. Step through the crutches with the uninvolved leg.    Partial Weight Bearing (PWB): Move the involved leg forward. Step down the weight of the leg only.  Step through the crutches with the uninvolved leg.    Weight Bearing As Tolerated (WBAT): Move the involved leg forward. Put as much pressure through the involved leg as you can tolerate comfortably. Then step through the crutches with the uninvolved leg.    Rev. 2014      Pending Results     No orders found from 2017 to 8/10/2017.            Admission Information     Date & Time Provider Department Dept. Phone    2017 Maynor Silvestre MD [facility] PACU [phone]      Your Vitals Were     Blood Pressure Pulse Temperature Respirations Height Weight    101/73 93 97.7  F (36.5  C) (Oral) 18 1.727 m (5' 8\") 84.2 kg (185 lb 10 oz)    Pulse Oximetry BMI (Body Mass Index)                97% 28.22 kg/m2          MyChart Information     [employer] lets you send messages to your doctor, view your test results, renew your prescriptions, schedule appointments and more. To sign up, go to [email or web address] . Click on \"Log in\" on the left side of the screen, which will take you to the Welcome page. Then click on \"Sign up Now\" on the right side of the page.     You will be asked to enter the access code listed below, as well as some personal information. Please follow the directions to create your username and password.     Your access code is: [email or web address]  Expires: 2017  6:30 AM     Your access code will  in 90 days. If you need help or a new code, please call your [facility] clinic or [phone].        Care EveryWhere ID     This is your Care EveryWhere ID. This could be used by other organizations to access your [facility] medical records  [id number]        Equal Access to Services     ABDULKADIR ROB AH: Hadii mercedez healy " lore Rios, waaxda luqadaha, qaybta kaalmada adeegyada, mina thomasn melissa gallagher laalessandramagda mily. [facility] [phone].    ATENCIÓN: Si hayderla marjorie, tiene a antony disposición servicios gratuitos de asistencia lingüística. Mehul al [phone].    We comply with applicable federal civil rights laws and [place] laws. We do not discriminate on the basis of race, color, national origin, age, disability sex, sexual orientation or gender identity.               Review of your medicines      UNREVIEWED medicines. Ask your doctor about these medicines        Dose / Directions    ARTIFICIAL TEARS OP        Refills:  0         START taking        Dose / Directions    acetaminophen 325 MG tablet   Commonly known as:  TYLENOL   Used for:  Bucket-handle tear of medial meniscus of left knee as current injury, subsequent encounter        Dose:  650 mg   Take 2 tablets (650 mg) by mouth every 4 hours as needed for other (mild pain)   Quantity:  100 tablet   Refills:  0       hydrOXYzine 25 MG tablet   Commonly known as:  ATARAX   Used for:  Bucket-handle tear of medial meniscus of left knee as current injury, subsequent encounter        Dose:  25 mg   Take 1 tablet (25 mg) by mouth every 6 hours as needed for itching (and nausea)   Quantity:  30 tablet   Refills:  0       ondansetron 4 MG ODT tab   Commonly known as:  ZOFRAN-ODT   Used for:  Bucket-handle tear of medial meniscus of left knee as current injury, subsequent encounter        Dose:  4-8 mg   Take 1-2 tablets (4-8 mg) by mouth every 8 hours as needed for nausea Dissolve ON the tongue.   Quantity:  4 tablet   Refills:  0       oxyCODONE 5 MG IR tablet   Commonly known as:  ROXICODONE   Used for:  Bucket-handle tear of medial meniscus of left knee as current injury, subsequent encounter        Dose:  5-10 mg   Take 1-2 tablets (5-10 mg) by mouth every 4 hours as needed for pain or other (Moderate to Severe)   Quantity:  40 tablet   Refills:  0       senna-docusate  8.6-50 MG per tablet   Commonly known as:  SENOKOT-S;PERICOLACE   Used for:  Bucket-handle tear of medial meniscus of left knee as current injury, subsequent encounter        Dose:  1-2 tablet   Take 1-2 tablets by mouth 2 times daily Take while on oral narcotics to prevent or treat constipation.   Quantity:  30 tablet   Refills:  0            Where to get your medicines      Some of these will need a paper prescription and others can be bought over the counter. Ask your nurse if you have questions.     Bring a paper prescription for each of these medications     acetaminophen 325 MG tablet    hydrOXYzine 25 MG tablet    ondansetron 4 MG ODT tab    oxyCODONE 5 MG IR tablet    senna-docusate 8.6-50 MG per tablet                Protect others around you: Learn how to safely use, store and throw away your medicines at www.disposemymeds.org.             Medication List: This is a list of all your medications and when to take them. Check marks below indicate your daily home schedule. Keep this list as a reference.      Medications           Morning Afternoon Evening Bedtime As Needed    acetaminophen 325 MG tablet   Commonly known as:  TYLENOL   Take 2 tablets (650 mg) by mouth every 4 hours as needed for other (mild pain)                                ARTIFICIAL TEARS OP                                hydrOXYzine 25 MG tablet   Commonly known as:  ATARAX   Take 1 tablet (25 mg) by mouth every 6 hours as needed for itching (and nausea)                                ondansetron 4 MG ODT tab   Commonly known as:  ZOFRAN-ODT   Take 1-2 tablets (4-8 mg) by mouth every 8 hours as needed for nausea Dissolve ON the tongue.                                oxyCODONE 5 MG IR tablet   Commonly known as:  ROXICODONE   Take 1-2 tablets (5-10 mg) by mouth every 4 hours as needed for pain or other (Moderate to Severe)                                senna-docusate 8.6-50 MG per tablet   Commonly known as:  SENOKOT-S;PERICOLACE   Take  1-2 tablets by mouth 2 times daily Take while on oral narcotics to prevent or treat constipation.

## 2017-08-10 NOTE — OP NOTE
DATE OF SURGERY:  08/09/2017.      PREOPERATIVE DIAGNOSIS:  Left knee bucket handle medial meniscus tear.      POSTOPERATIVE DIAGNOSIS:  Left knee bucket handle medial meniscus tear.      SURGEON:  Maynor Silvestre MD      ASSISTANT:  Dylan David DO, orthopedic fellow.  No resident was available for assistance.  The orthopedic fellow was required for retraction and assistance in suture passage for the meniscus repair.      ANESTHETIC:  General plus postoperative block.      DRAINS:  None.      SPONGE AND NEEDLE COUNT:  Correct.      MATERIAL FORWARDED TO THE LABORATORY:  None.      OPERATION PERFORMED:  Left knee medial meniscus repair.      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  She is quite uncomfortable.  Mai, her mother, and I have had a long discussion about options.  We do not think nonsurgical treatment is practical given her locked knee.  We discussed arthroscopy and evaluation of the meniscus.  We discussed meniscus repair versus meniscectomy based on the condition of the meniscus.  I did have a chance to explain the surgery and the surgical risks including bleeding, infection, nerve damage, complications from anesthesia, blood clot, etc.  We also discussed the more pertinent risks for this type of surgery, including failure of the meniscus to heal.  This may require secondary surgery, loss of range of motion or scar tissue.  There could be pain or numbness from incision sites.  If we have to remove the meniscus, there is a possibility that the patient develop early degenerative disease of the knee.  Mai had a chance to have her questions answered.  She understands the surgery as well as the risks and would like to proceed.      OPERATIVE FINDINGS:  Examination under anesthesia reveals 5 degrees to 120 degrees range of motion.  Negative Lachman.  No pivot.  No posterior drawer.  No  opening to varus or valgus stress.  The diagnostic arthroscopy reveals a normal-appearing suprapatellar pouch.  The patellofemoral joint is normal.  The medial and lateral gutters are normal.  The lateral compartment reveals an intact lateral meniscus and normal articular cartilage surfaces.  The notch reveals intact cruciate ligaments.  The medial compartment reveals a large fragment of displaced medial meniscus.  There is an approximately 2-3 mm peripheral rim.  The displaced fragment has degenerative fraying and horizontal cleavage component.  The articular cartilage reveals some minimal fibrillation of the femoral condyle and tibial plateau.      IMPLANTS:  Multiple 2-0 FiberWire meniscal repair needles.      DESCRIPTION OF THE OPERATION:  After the patient was counseled, plans, alternatives and risks were discussed, consent was obtained.  The correct operative extremity was marked in the preoperative holding area.  Preoperative antibiotics were administered.  The patient was brought back to the operating suite and administered a general anesthetic.  The examination under anesthesia was performed and the findings are noted above.  The left lower extremity was prepped and draped in the usual sterile fashion.  A timeout process was completed.  A standard anterolateral arthroscopy portal was created followed by a superomedial portal for the outflow cannula and an anteromedial portal for the working instruments.  A thorough diagnostic arthroscopy was undertaken and the findings are noted above.  The medial meniscus could be easily reduced.  The condition of the meniscus was somewhat concerning given a horizontal cleavage component as well as some degenerative changes.  However, because of the patient's young age and the red-red to possible red-white location of the tear, we elected to perform a repair.  The eduardo-meniscal synovium and meniscal rim was abraded aggressively.  A posteromedial knee incision was created.   Hemostasis obtained with electrocautery.  Dissection carried through subcutaneous tissue.  The fascia was incised in line with its fibers, allowing placement of a retractor along the posteromedial capsule.  Multiple 2-0 FiberWire meniscal needles were placed on the inferior surface of the meniscus as well as the superior surface of the meniscus in vertical mattress fashion.  The sutures were tied and the repair was noted to be quite stable.  A microfracture awl was used to create multiple holes in the notch to allow mesenchymal stem cells into the knee.  A cascade fibrin clot was then placed underneath the meniscus to aid in repair.  The medial incision was lavaged and the incision was closed in layers with Monocryl and Monocryl.  Portal sites were closed with nylon.  A sterile dressing was applied and the patient was placed in a hinged knee brace locked in full extension.  She was extubated on the operating room table and brought to the recovery room in good condition.  She tolerated the procedure well and there were no complications.  Estimated blood loss was 5 mL.  A tourniquet was not used.      DISPOSITION:  The patient will be discharged home through Same Day Surgery per protocol.  Her weightbearing status will be as tolerated in full extension.  Her range of motion will be full extension to 90 degrees of flexion.  She may change her dressing on postoperative day #2 and redress her incisions with gauze, Band-Aids and Tubigrip.  She may get her incisions wet in the shower on postoperative day #5.  The patient was given oxycodone for postoperative pain.  She may have a refill of Norco or oxycodone in the first month postoperatively.  She will follow a meniscus repair rehabilitation protocol.  She will follow up with me on postoperative day #8 at [facility] for suture removal and wound check.         SURAJ PAYTON MD             D: 08/09/2017 17:11   T: 08/10/2017 02:59   MT: colt      Name:      ARIAS RADHA   MRN:      [id number]        Account:        [id number]   :      1995           Procedure Date: 2017      Document: [id number]

## 2017-10-06 ENCOUNTER — TRANSFERRED RECORDS (OUTPATIENT)
Dept: HEALTH INFORMATION MANAGEMENT | Facility: CLINIC | Age: 22
End: 2017-10-06

## 2017-11-20 ENCOUNTER — OFFICE VISIT (OUTPATIENT)
Dept: OPHTHALMOLOGY | Facility: CLINIC | Age: 22
End: 2017-11-20

## 2017-11-20 DIAGNOSIS — H04.122 INSUFFICIENCY OF TEAR FILM OF LEFT EYE: ICD-10-CM

## 2017-11-20 DIAGNOSIS — G51.0 FACIAL NERVE PALSY: Primary | ICD-10-CM

## 2017-11-20 RX ORDER — NICOTINE POLACRILEX 2 MG
GUM BUCCAL
COMMUNITY

## 2017-11-20 RX ORDER — TRIAMTERENE/HYDROCHLOROTHIAZID 37.5-25 MG
1 TABLET ORAL DAILY
COMMUNITY
End: 2017-12-22

## 2017-11-20 ASSESSMENT — CONF VISUAL FIELD
OD_NORMAL: 1
OS_NORMAL: 1
METHOD: COUNTING FINGERS

## 2017-11-20 ASSESSMENT — SLIT LAMP EXAM - LIDS
COMMENTS: NORMAL
COMMENTS: NORMAL

## 2017-11-20 ASSESSMENT — VISUAL ACUITY
OS_SC+: -2
OD_SC: 20/15
OD_SC+: -1
OS_SC: 20/30
METHOD: SNELLEN - LINEAR

## 2017-11-20 ASSESSMENT — REFRACTION_MANIFEST
OD_CYLINDER: SPHERE
OS_SPHERE: -0.75
OD_SPHERE: PLANO
OS_CYLINDER: +0.50
OS_AXIS: 115

## 2017-11-20 ASSESSMENT — TONOMETRY
OD_IOP_MMHG: 16
IOP_METHOD: ICARE
OS_IOP_MMHG: 12

## 2017-11-20 ASSESSMENT — EXTERNAL EXAM - LEFT EYE: OS_EXAM: 1+ BROW PTOSIS

## 2017-11-20 ASSESSMENT — EXTERNAL EXAM - RIGHT EYE: OD_EXAM: NORMAL

## 2017-11-20 NOTE — MR AVS SNAPSHOT
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      Your next 10 appointments already scheduled     Nov 21, 2017  8:30 AM CST   Return Visit with Ayaka Sevilla, OD   Eye Clinic ([facility])    Carlo Cabrera [facility]  Clinic 9a  [address] [facility] [address] [phone]            Dec 22, 2017  8:00 AM CST   (Arrive by 7:45 AM)   Return Visit with Mena Galeana MD   [facility] Ear Nose and Throat ([facility] [place])    [address] [address] [phone]              Who to contact     Please call your clinic at [phone] to:    Ask questions about your health    Make or cancel appointments    Discuss your medicines    Learn about your test results    Speak to your doctor   If you have compliments or concerns about an experience at your clinic, or if you wish to file a complaint, please contact [facility] Patient Relations at [phone] or email us at [email or web address]         Additional Information About Your Visit        MyChart Information     SciAps is an electronic gateway that provides easy, online access to your medical records. With SciAps, you can request a clinic appointment, read your test results, renew a prescription or communicate with your care team.     To sign up for Mindiet visit the website at [email or web address]   You will be asked to enter the access code listed below, as well as some  personal information. Please follow the directions to create your username and password.     Your access code is: [email or web address]  Expires: 2018  6:31 AM     Your access code will  in 90 days. If you need help or a new code, please contact your [facility] or call [phone] for assistance.        Care EveryWhere ID     This is your Care EveryWhere ID. This could be used by other organizations to access your [place] medical records  [id number]         Blood Pressure from Last 3 Encounters:   17 94/59   14 100/48    Weight from Last 3 Encounters:   17 84.2 kg (185 lb 10 oz) (96 %)*   14 76.7 kg (169 lb) (94 %)*     * Growth percentiles are based on [facility][note 2] 2-20 Years data.              Today, you had the following     No orders found for display       Primary Care Provider Office Phone # Fax #    [facility] [phone] [address] [facility] [address]        Equal Access to Services     [facility] Research HospitalKATHRINE : Hadii aad ku hadasho Soomaali, waaxda luqadaha, qaybta kaalmada adehuber, mina jenkins . So [facility] [phone].    ATENCIÓN: Si habla español, tiene a antony disposición servicios gratuitos de asistencia lingüística. Mehul al [phone].    We comply with applicable federal civil rights laws and [place] laws. We do not discriminate on the basis of race, color, national origin, age, disability, sex, sexual orientation, or gender identity.            Thank you!     Thank you for choosing [facility] OPHTHALMOLOGY  for your care. Our goal is always to provide you with excellent care. Hearing back from our patients is one way we can continue to improve our services. Please take a few minutes to complete the written survey that you may receive in the mail after your visit with us. Thank you!             Your Updated Medication List - Protect others around you: Learn how to safely use, store  and throw away your medicines at www.disposemymeds.org.          This list is accurate as of: 11/20/17  8:32 AM.  Always use your most recent med list.                   Brand Name Dispense Instructions for use Diagnosis    acetaminophen 325 MG tablet    TYLENOL    100 tablet    Take 2 tablets (650 mg) by mouth every 4 hours as needed for other (mild pain)    Bucket-handle tear of medial meniscus of left knee as current injury, subsequent encounter       ARTIFICIAL TEARS OP           Biotin 1 MG Caps           FISH OIL + D3 PO           hydrOXYzine 25 MG tablet    ATARAX    30 tablet    Take 1 tablet (25 mg) by mouth every 6 hours as needed for itching (and nausea)    Bucket-handle tear of medial meniscus of left knee as current injury, subsequent encounter       ondansetron 4 MG ODT tab    ZOFRAN-ODT    4 tablet    Take 1-2 tablets (4-8 mg) by mouth every 8 hours as needed for nausea Dissolve ON the tongue.    Bucket-handle tear of medial meniscus of left knee as current injury, subsequent encounter       oxyCODONE IR 5 MG tablet    ROXICODONE    40 tablet    Take 1-2 tablets (5-10 mg) by mouth every 4 hours as needed for pain or other (Moderate to Severe)    Bucket-handle tear of medial meniscus of left knee as current injury, subsequent encounter       senna-docusate 8.6-50 MG per tablet    SENOKOT-S;PERICOLACE    30 tablet    Take 1-2 tablets by mouth 2 times daily Take while on oral narcotics to prevent or treat constipation.    Bucket-handle tear of medial meniscus of left knee as current injury, subsequent encounter       triamterene-hydrochlorothiazide 37.5-25 MG per tablet    MAXZIDE-25     Take 1 tablet by mouth daily

## 2017-11-20 NOTE — NURSING NOTE
"Chief Complaints and History of Present Illnesses   Patient presents with     Follow Up For     prescription for glasses LE     HPI    Affected eye(s):  Left   Symptoms:     Blurred vision (Comment: Patient notes unsure if any changes)   Redness (Comment: \"sometimes\" per pt)   No foreign body sensation   No tearing   Dryness   Itching   No burning      Duration:  4 months      Do you have eye pain now?:  No      Comments:  Patient notes she has ANGELINA, started taking a diuretic for hearing loss, and going to school in [place]. Since than eyes have been very dry    Brenda Ribeiro November 20, 2017 7:52 AM               "

## 2017-11-20 NOTE — PROGRESS NOTES
Chief Complaints and History of Present Illnesses   Patient presents with     Follow Up For     prescription for glasses LE     Here for possible glasses for her left eye.  She has dryness of her left eye as well.  She has started fish oil.           Mai Montana is a 21 year old female with the following diagnoses:   1. Facial nerve palsy    2. 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  Using refresh and gel drops.  Consider trial of daily moisture rich contacts in the left eye only to improve acuity.  If no improvement, consider restasis OS.    Followup with us in the summer         Lisa Bailey MD  Ophthalmic Plastic and Reconstructive Surgery Fellow    Attending Physician Attestation:  I have seen and examined this patient.  I have confirmed and edited as necessary the chief complaint(s), history of present illness, review of systems, relevant history, and examination findings as documented by others.  I have personally reviewed the relevant tests, images, and reports as documented above.  I have confirmed and edited as necessary the assessment and plan and agree with this note.    - Nabil Tomlin MD 8:05 AM 11/20/2017

## 2017-11-21 ENCOUNTER — OFFICE VISIT (OUTPATIENT)
Dept: OPTOMETRY | Facility: CLINIC | Age: 22
End: 2017-11-21

## 2017-11-21 DIAGNOSIS — H16.212 EXPOSURE KERATOCONJUNCTIVITIS OF LEFT EYE: Primary | ICD-10-CM

## 2017-11-21 DIAGNOSIS — H04.123 DRY EYES: ICD-10-CM

## 2017-11-21 ASSESSMENT — VISUAL ACUITY
METHOD: SNELLEN - LINEAR
OS_SC: 20/50-1
OD_SC: 20/20

## 2017-11-21 ASSESSMENT — EXTERNAL EXAM - LEFT EYE: OS_EXAM: 1+ BROW PTOSIS

## 2017-11-21 ASSESSMENT — EXTERNAL EXAM - RIGHT EYE: OD_EXAM: NORMAL

## 2017-11-21 ASSESSMENT — SLIT LAMP EXAM - LIDS
COMMENTS: NORMAL
COMMENTS: NORMAL

## 2017-11-21 ASSESSMENT — REFRACTION_WEARINGRX
OS_CYLINDER: +0.50
OS_SPHERE: -0.75
OS_AXIS: 115
OD_CYLINDER: SPHERE
OD_SPHERE: PLANO

## 2017-11-21 ASSESSMENT — REFRACTION_CURRENTRX
OS_BASECURVE: 8.5
OS_DIAMETER: 14.1
OS_BRAND: DAILIES TOTAL 1
OS_SPHERE: -0.50

## 2017-11-21 NOTE — PROGRESS NOTES
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 Tomlin  -BCL would be good option for ocular surface protection  -Good comfort/fit with DT1, improved acuity  -Successful I&R, reviewed CL care and hygiene  -Option for scleral lens if symptoms do not improve, but would favor daily disposable 2' to ease of use    RTC as needed for refit, otherwise 1 year CL recheck

## 2017-11-21 NOTE — MR AVS SNAPSHOT
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    -  1    Dry eyes           Follow-ups after your visit        Your next 10 appointments already scheduled     Dec 22, 2017  8:00 AM CST   (Arrive by 7:45 AM)   Return Visit with Mena Galeana MD   [facility] Ear Nose and Throat ([facility])    [address] [facility] [address] [phone]            Feb 23, 2018 11:30 AM CST   (Arrive by 11:15 AM)   Return Visit with Maynor Silvestre MD   [facility] Sports Medicine ([facility])    [address] [facility] [address] [phone]              Who to contact     Please call your clinic at [phone] to:    Ask questions about your health    Make or cancel appointments    Discuss your medicines    Learn about your test results    Speak to your doctor   If you have compliments or concerns about an experience at your clinic, or if you wish to file a complaint, please contact [facility] Patient Relations at [phone] or email us at [email or web address]         Additional Information About Your Visit        MyChart Information     [employer] is an electronic gateway that provides easy, online access to your medical records. With [employer], you can request a clinic appointment, read your test results, renew a prescription or communicate with your care team.     To sign up for Footwayt visit the website at [email or web address]   You will be asked to enter the access code listed below, as well as some personal information. Please follow the directions to create your username and  password.     Your access code is: [email or web address]  Expires: 2018  6:31 AM     Your access code will  in 90 days. If you need help or a new code, please contact your [facility] or call [phone] for assistance.        Care EveryWhere ID     This is your Care EveryWhere ID. This could be used by other organizations to access your [place] medical records  [id number]         Blood Pressure from Last 3 Encounters:   17 94/59   14 100/48    Weight from Last 3 Encounters:   17 84.2 kg (185 lb 10 oz) (96 %)*   14 76.7 kg (169 lb) (94 %)*     * Growth percentiles are based on [facility] 2-20 Years data.              Today, you had the following     No orders found for display       Primary Care Provider Office Phone # Fax #    [facility] [phone]       [address] [place] [address]        Equal Access to Services     ABDULKADIR ROB AH: Hadii aad ku hadasho Soomaali, waaxda luqadaha, qaybta kaalmada adeegyada, waxay idiin hayemelia jenkins . So [facility] [phone].    ATENCIÓN: Si habla español, tiene a antony disposición servicios gratuitos de asistencia lingüística. Llame al [phone].    We comply with applicable federal civil rights laws and [place] laws. We do not discriminate on the basis of race, color, national origin, age, disability, sex, sexual orientation, or gender identity.            Thank you!     Thank you for choosing EYE CLINIC  for your care. Our goal is always to provide you with excellent care. Hearing back from our patients is one way we can continue to improve our services. Please take a few minutes to complete the written survey that you may receive in the mail after your visit with us. Thank you!             Your Updated Medication List - Protect others around you: Learn how to safely use, store and throw away your medicines at www.disposemymeds.org.          This list is accurate as  of: 11/21/17  8:48 AM.  Always use your most recent med list.                   Brand Name Dispense Instructions for use Diagnosis    acetaminophen 325 MG tablet    TYLENOL    100 tablet    Take 2 tablets (650 mg) by mouth every 4 hours as needed for other (mild pain)    Bucket-handle tear of medial meniscus of left knee as current injury, subsequent encounter       ARTIFICIAL TEARS OP           Biotin 1 MG Caps           FISH OIL + D3 PO           hydrOXYzine 25 MG tablet    ATARAX    30 tablet    Take 1 tablet (25 mg) by mouth every 6 hours as needed for itching (and nausea)    Bucket-handle tear of medial meniscus of left knee as current injury, subsequent encounter       ondansetron 4 MG ODT tab    ZOFRAN-ODT    4 tablet    Take 1-2 tablets (4-8 mg) by mouth every 8 hours as needed for nausea Dissolve ON the tongue.    Bucket-handle tear of medial meniscus of left knee as current injury, subsequent encounter       oxyCODONE IR 5 MG tablet    ROXICODONE    40 tablet    Take 1-2 tablets (5-10 mg) by mouth every 4 hours as needed for pain or other (Moderate to Severe)    Bucket-handle tear of medial meniscus of left knee as current injury, subsequent encounter       senna-docusate 8.6-50 MG per tablet    SENOKOT-S;PERICOLACE    30 tablet    Take 1-2 tablets by mouth 2 times daily Take while on oral narcotics to prevent or treat constipation.    Bucket-handle tear of medial meniscus of left knee as current injury, subsequent encounter       triamterene-hydrochlorothiazide 37.5-25 MG per tablet    MAXZIDE-25     Take 1 tablet by mouth daily

## 2017-12-12 ENCOUNTER — TRANSFERRED RECORDS (OUTPATIENT)
Dept: HEALTH INFORMATION MANAGEMENT | Facility: CLINIC | Age: 22
End: 2017-12-12

## 2017-12-22 ENCOUNTER — OFFICE VISIT (OUTPATIENT)
Dept: OTOLARYNGOLOGY | Facility: CLINIC | Age: 22
End: 2017-12-22
Payer: COMMERCIAL

## 2017-12-22 ENCOUNTER — OFFICE VISIT (OUTPATIENT)
Dept: AUDIOLOGY | Facility: CLINIC | Age: 22
End: 2017-12-22
Payer: COMMERCIAL

## 2017-12-22 DIAGNOSIS — H91.92: Primary | ICD-10-CM

## 2017-12-22 DIAGNOSIS — H90.42 SENSORINEURAL HEARING LOSS (SNHL) OF LEFT EAR WITH UNRESTRICTED HEARING OF RIGHT EAR: Primary | ICD-10-CM

## 2017-12-22 ASSESSMENT — PAIN SCALES - GENERAL: PAINLEVEL: NO PAIN (0)

## 2017-12-22 NOTE — PATIENT INSTRUCTIONS
1. We will obtain the MRI scan for Dr. Galeana to review.   2. Please call the ENT clinic with any questions,concerns, new or worsening symptoms.    -Clinic number is [phone]   - Jihan's direct line (Dr. Galeana' nurse) [phone]    3. Please arrange an appointment to see audiology to discuss the CROS hearing aid.   4. Please review the booklet given to you regarding the cochlear implant.

## 2017-12-22 NOTE — NURSING NOTE
Chief Complaint   Patient presents with     RECHECK     Return F/U Tinnitus, pt was seen in [place] while away at school. Pt had audio today and has records with, and MRI report with.        MARYJO Michael LPN

## 2017-12-22 NOTE — MR AVS SNAPSHOT
After Visit Summary   12/22/2017    Mai Montana    MRN: [id number]           Patient Information     Date Of Birth          1995        Visit Information        Provider Department      12/22/2017 8:00 AM Mena Galeana MD [facility] Ear Nose and Throat        Today's Diagnoses     Hearing loss    -  1      Care Instructions    1. We will obtain the MRI scan for Dr. Galeana to review.   2. Please call the ENT clinic with any questions,concerns, new or worsening symptoms.    -Clinic number is [phone]   - Jihan's direct line (Dr. Galeana' nurse) [phone]    3. Please arrange an appointment to see audiology to discuss the CROS hearing aid.   4. Please review the booklet given to you regarding the cochlear implant.           Follow-ups after your visit        Additional Services     AUDIOLOGY ADULT REFERRAL       [facility] [place] [phone]   [email or web address]    Specialty Testing:  CROS hearing aid eval                  Your next 10 appointments already scheduled     Feb 23, 2018 11:30 AM CST   (Arrive by 11:15 AM)   Return Visit with Maynor Silvestre MD   [facility] ([facility])    [address] [place] [address] [phone]              Who to contact     Please call your clinic at [phone] to:    Ask questions about your health    Make or cancel appointments    Discuss your medicines    Learn about your test results    Speak to your doctor   If you have compliments or concerns about an experience at your clinic, or if you wish to file a complaint, please contact [facility] Phys[facility] Patient Relations at [phone] or email us at [email or web address]         Additional Information About Your Visit        MyChart Information      [employer] is an electronic gateway that provides easy, online access to your medical records. With PolicyStat, you can request a clinic appointment, read your test results, renew a prescription or communicate with your care team.     To sign up for PolicyStat visit the website at [email or web address]   You will be asked to enter the access code listed below, as well as some personal information. Please follow the directions to create your username and password.     Your access code is: [email or web address]  Expires: 2018  6:31 AM     Your access code will  in 90 days. If you need help or a new code, please contact your [facility] or call [phone] for assistance.        Care EveryWhere ID     This is your Care EveryWhere ID. This could be used by other organizations to access your [place] medical records  [id number]         Blood Pressure from Last 3 Encounters:   17 94/59   14 100/48    Weight from Last 3 Encounters:   17 84.2 kg (185 lb 10 oz) (96 %)*   14 76.7 kg (169 lb) (94 %)*     * Growth percentiles are based on CDC 2-20 Years data.              We Performed the Following     AUDIOLOGY ADULT REFERRAL        Primary Care Provider Office Phone # Fax #    [facility] [phone] [address] [facility] [address]        Equal Access to Services     ABDULKADIR ROB AH: Hadii aad ku hadasho Soomaali, waaxda luqadaha, qaybta kaalmada adeegyada, waxay akiko hayzenobian melissa jenkins . So [facility] [phone].    ATENCIÓN: Si habla español, tiene a antony disposición servicios gratuitos de asistencia lingüística. Llame al [phone].    We comply with applicable federal civil rights laws and [place] laws. We do not discriminate on the basis of race, color, national origin, age, disability, sex, sexual orientation, or gender identity.            Thank you!     Thank you for choosing [facility] EAR NOSE AND THROAT  for  your care. Our goal is always to provide you with excellent care. Hearing back from our patients is one way we can continue to improve our services. Please take a few minutes to complete the written survey that you may receive in the mail after your visit with us. Thank you!             Your Updated Medication List - Protect others around you: Learn how to safely use, store and throw away your medicines at www.disposemymeds.org.          This list is accurate as of: 12/22/17  8:38 AM.  Always use your most recent med list.                   Brand Name Dispense Instructions for use Diagnosis    acetaminophen 325 MG tablet    TYLENOL    100 tablet    Take 2 tablets (650 mg) by mouth every 4 hours as needed for other (mild pain)    Bucket-handle tear of medial meniscus of left knee as current injury, subsequent encounter       ARTIFICIAL TEARS OP           Biotin 1 MG Caps           FISH OIL + D3 PO

## 2017-12-22 NOTE — LETTER
12/22/2017       RE: Mai Montana  [address]  [facility] [address]     Dear Colleague,    Thank you for referring your patient, Mai Montana, to the [facility] EAR NOSE AND THROAT at [facility]. Please see a copy of my visit note below.    I had the pleasure of seeing Mai Montana in the neuro-otology clinic today.    She is a 22-year-old woman who suffered a serious fall in November 2013 resulting in a temporal bone fracture which traveled through the left vestibule and fallopian canal.  This resulted in high-frequency sensorineural hearing loss and left facial paralysis.  She experienced gradual significant improvement in facial function.  She is also had an eyelid weight placed.    She returns today to clinic because subsequently she has developed progressive sensorineural hearing loss.  She is in college in [place].  She developed tinnitus in the left ear around Labor Day of 2017.  She was evaluated by an audiologist at [facility] on September 21, 2017 and was found to have a 60 dB low-frequency sensorineural hearing loss in the low frequencies on the left along with slight progression of the high frequency severe sensorineural loss that we have noted in her prior audiograms.  At that time word understanding score remained at 100% and speech reception threshold was still 20 dB.  Right ear hearing remained normal.  Despite management strategies geared towards hydrops, she experienced ongoing deterioration of hearing in the left ear with worsening tinnitus.  She has not experienced any further vertigo.  The last audiogram performed in [place] on December 12, 2017 demonstrated left profound sensorineural hearing loss in word understanding cannot be tested.  Right ear remained stable.    Other than the significant hearing loss and tinnitus, she is not having other otologic symptoms.  She is doing well with the eyelid weight and with facial  nerve function at this time.    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     Past Surgical History:   Procedure Laterality Date     ARTHROSCOPY KNEE WITH MENISCAL REPAIR Left 8/9/2017    Procedure: ARTHROSCOPY KNEE WITH MENISCAL REPAIR;  Left Knee Arthroscopy, Medial Meniscal Repair;  Surgeon: Maynor Silvestre MD;  [facility]     ARTHROSCOPY WRIST  4/17/2014    Procedure: Left Wrist Arthroscopy With Left Ulnar Styroid Open Reduction Internal Fixation, Left Distal Radius Plate Removal;  Surgeon: Jessica Hayes MD;  [facility]     OPEN REDUCTION INTERNAL FIXATION WRIST  4/17/2014    Procedure: OPEN REDUCTION INTERNAL FIXATION WRIST;  Surgeon: Jessica Hayes MD;  Location:  OR     REMOVE HARDWARE WRIST  4/17/2014    Procedure: REMOVE HARDWARE WRIST;  Surgeon: Jessica Hayes MD;  Location:  OR     TARSORRHAPHY  1/29/2014    LE with upper lid weight   She is in the process of receiving additional orthopedic care.    Current Outpatient Prescriptions   Medication     Fish Oil-Cholecalciferol (FISH OIL + D3 PO)     Biotin 1 MG CAPS     acetaminophen (TYLENOL) 325 MG tablet     Hypromellose (ARTIFICIAL TEARS OP)     No current facility-administered medications for this visit.      Social History   Substance Use Topics     Smoking status: Never Smoker     Smokeless tobacco: Never Used     Alcohol use No     Family History   Problem Relation Age of Onset     Obesity Mother      Eye Disorder Brother      Depression Brother      Glaucoma No family hx of      Macular Degeneration No family hx of      Patient Supplied Answers to Review of Systems   ENT ROS 12/22/2017   Ears, Nose, Throat Hearing loss, Ringing/noise in ears   The remainder of the 10 point review of systems was negative.    Physical examination:  The patient appeared well and was in no acute distress.  She is breathing  comfortably without stridor or stertor.  On cranial nerve examination, she has full extraocular motility and normal facial sensation.  She has a left upper eyelid weight in place.  There is modest synkinesis.  However she is able to activate all branches.  She is a grade 3 House-Brackman.  Symmetric at rest.  Left external auditory canal lined with healthy skin.  Tympanic membrane intact with no step-offs or concerns related to fracture.  Middle ear spaces clear.  The right ear similarly has normal appearance.    Audiograms: I reviewed the series of audiograms performed in [place] and there results are described in detail above.  There has been a progression from initially a primarily high-frequency sensorineural hearing loss to shift towards both low and high frequency loss to profound sensorineural hearing loss.    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  We discussed that this is often observed following trauma even in a delayed fashion and that we do not know how to prevent the delayed onset of this at this time.  As the patient is aware, this unfortunately is a permanent change in hearing.  I do not recommend additional intervention to the ear at this time.    We discussed strategies for aural rehabilitation.  I recommended that she undergo evaluation for a cross hearing aid versus BAHA.  We also discussed that there is interest in determining if patients with single-sided deafness benefit from cochlear implantation. I would like to review the MRI scan that she had done as part of the evaluation.  We may be able to visualize whether the cochlea has any patency.  Currently, cochlear implantation is not generally approved for single-sided hearing loss but this is not always the case.  We discussed that results can be mixed in that not all patients who can have a cochlear  implant (meaning that they maintain cochlear patency) placed use the device when they have a normal hearing contralateral ear.  We provided a booklet on the different devices.    Mai and her mom both indicated that they would like to first start with the evaluation for the cross hearing aid.  They will decide if they would like to pursue that here in [place] but it may be better to pursue this in [place] as she has excellent audiologic and otolaryngologic care there and could work with her local team to achieve the best results with the amplification.    Mena Galeana MD  Otology & Neurotology  [facility]    I spent a total of 30 minutes face-to-face with Mai Montana during today s office visit. Over 50% of this time was spent counseling the patient and/or coordinating care regarding progressive posttraumatic hearing loss.      Again, thank you for allowing me to participate in the care of your patient.      Sincerely,    Mena Galeana MD

## 2017-12-22 NOTE — MR AVS SNAPSHOT
After Visit Summary   12/22/2017    Mai Montana    MRN: [id number]           Patient Information     Date Of Birth          1995        Visit Information        Provider Department      12/22/2017 7:00 AM Loretta Mendez AuD [facility] Audiology        Today's Diagnoses     Sensorineural hearing loss (SNHL) of left ear with unrestricted hearing of right ear    -  1       Follow-ups after your visit        Your next 10 appointments already scheduled     Dec 22, 2017  8:00 AM CST   (Arrive by 7:45 AM)   Return Visit with Mena Galeana MD   [facility] Ear Nose and Throat ([facility])    [address]  4th Floor  [facility] [id number] [phone]            Feb 23, 2018 11:30 AM CST   (Arrive by 11:15 AM)   Return Visit with Maynor Silvestre MD   [facility] Sports Medicine ([facility])    [address]  [address]  [facility] [id number] [phone]              Who to contact     Please call your clinic at [phone] to:    Ask questions about your health    Make or cancel appointments    Discuss your medicines    Learn about your test results    Speak to your doctor   If you have compliments or concerns about an experience at your clinic, or if you wish to file a complaint, please contact [facility] Patient Relations at [phone] or email us at [email or web address]         Additional Information About Your Visit        MyChart Information     Codelearnt is an electronic gateway that provides easy, online access to your medical records. With Pharminox, you can request a clinic appointment, read your test results, renew a prescription or communicate with your care team.     To sign up for Codelearnt visit the website at [email or web address]   You will be asked to enter the access code listed below, as well as some personal information. Please follow the directions to  create your username and password.     Your access code is: [email or web address]  Expires: 2018  6:31 AM     Your access code will  in 90 days. If you need help or a new code, please contact your [facility] or call [phone] for assistance.        Care EveryWhere ID     This is your Care EveryWhere ID. This could be used by other organizations to access your [facility] medical records  [id number]         Blood Pressure from Last 3 Encounters:   17 94/59   14 100/48    Weight from Last 3 Encounters:   17 84.2 kg (185 lb 10 oz) (96 %)*   14 76.7 kg (169 lb) (94 %)*     * Growth percentiles are based on [facility] 2-20 Years data.              We Performed the Following     AUDIOGRAM/TYMPANOGRAM - INTERFACE     [facility] Audiometry Thrshld Eval & Speech Recog ([address])     Zachary   ([id number])     Tymps / Reflex   ([id number])        Primary Care Provider Office Phone # Fax #    [facility] [phone]       [address] [place] [id number]        Equal Access to Services     ABDULKADIR ROB AH: Hadii mercedez ku hadasho Soomaali, waaxda luqadaha, qaybta kaalmada adeegyada, mina minor. So [facility] [phone].    ATENCIÓN: Si habla español, tiene a antony disposición servicios gratuitos de asistencia lingüística. Llame al [phone].    We comply with applicable federal civil rights laws and [place] laws. We do not discriminate on the basis of race, color, national origin, age, disability, sex, sexual orientation, or gender identity.            Thank you!     Thank you for choosing [facility] AUDIOLOGY  for your care. Our goal is always to provide you with excellent care. Hearing back from our patients is one way we can continue to improve our services. Please take a few minutes to complete the written survey that you may receive in the mail after your visit with us. Thank you!             Your Updated Medication List -  Protect others around you: Learn how to safely use, store and throw away your medicines at www.disposemymeds.org.          This list is accurate as of: 12/22/17  7:39 AM.  Always use your most recent med list.                   Brand Name Dispense Instructions for use Diagnosis    acetaminophen 325 MG tablet    TYLENOL    100 tablet    Take 2 tablets (650 mg) by mouth every 4 hours as needed for other (mild pain)    Bucket-handle tear of medial meniscus of left knee as current injury, subsequent encounter       ARTIFICIAL TEARS OP           Biotin 1 MG Caps           FISH OIL + D3 PO           hydrOXYzine 25 MG tablet    ATARAX    30 tablet    Take 1 tablet (25 mg) by mouth every 6 hours as needed for itching (and nausea)    Bucket-handle tear of medial meniscus of left knee as current injury, subsequent encounter       ondansetron 4 MG ODT tab    ZOFRAN-ODT    4 tablet    Take 1-2 tablets (4-8 mg) by mouth every 8 hours as needed for nausea Dissolve ON the tongue.    Bucket-handle tear of medial meniscus of left knee as current injury, subsequent encounter       oxyCODONE IR 5 MG tablet    ROXICODONE    40 tablet    Take 1-2 tablets (5-10 mg) by mouth every 4 hours as needed for pain or other (Moderate to Severe)    Bucket-handle tear of medial meniscus of left knee as current injury, subsequent encounter       senna-docusate 8.6-50 MG per tablet    SENOKOT-S;PERICOLACE    30 tablet    Take 1-2 tablets by mouth 2 times daily Take while on oral narcotics to prevent or treat constipation.    Bucket-handle tear of medial meniscus of left knee as current injury, subsequent encounter       triamterene-hydrochlorothiazide 37.5-25 MG per tablet    MAXZIDE-25     Take 1 tablet by mouth daily

## 2017-12-22 NOTE — PROGRESS NOTES
AUDIOLOGY REPORT    SUMMARY: Audiology visit completed. See audiogram for results.      RECOMMENDATIONS: Follow-up with ENT.    Olu Can.  Licensed Audiologist  [place] #[id number]

## 2018-01-14 NOTE — PROGRESS NOTES
I had the pleasure of seeing Mai Montana in the neuro-otology clinic today.    She is a 22-year-old woman who suffered a serious fall in November 2013 resulting in a temporal bone fracture which traveled through the left vestibule and fallopian canal.  This resulted in high-frequency sensorineural hearing loss and left facial paralysis.  She experienced gradual significant improvement in facial function.  She is also had an eyelid weight placed.    She returns today to clinic because subsequently she has developed progressive sensorineural hearing loss.  She is in college in [place].  She developed tinnitus in the left ear around Labor Day of 2017.  She was evaluated by an audiologist at [facility] on September 21, 2017 and was found to have a 60 dB low-frequency sensorineural hearing loss in the low frequencies on the left along with slight progression of the high frequency severe sensorineural loss that we have noted in her prior audiograms.  At that time word understanding score remained at 100% and speech reception threshold was still 20 dB.  Right ear hearing remained normal.  Despite management strategies geared towards hydrops, she experienced ongoing deterioration of hearing in the left ear with worsening tinnitus.  She has not experienced any further vertigo.  The last audiogram performed in [place] on December 12, 2017 demonstrated left profound sensorineural hearing loss in word understanding cannot be tested.  Right ear remained stable.    Other than the significant hearing loss and tinnitus, she is not having other otologic symptoms.  She is doing well with the eyelid weight and with facial nerve function at this time.    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     Past Surgical History:   Procedure Laterality Date     ARTHROSCOPY KNEE WITH MENISCAL REPAIR Left 8/9/2017    Procedure:  ARTHROSCOPY KNEE WITH MENISCAL REPAIR;  Left Knee Arthroscopy, Medial Meniscal Repair;  Surgeon: Maynor Silvestre MD;  [facility]     ARTHROSCOPY WRIST  4/17/2014    Procedure: Left Wrist Arthroscopy With Left Ulnar Styroid Open Reduction Internal Fixation, Left Distal Radius Plate Removal;  Surgeon: Jessica Hayes MD;  Location:  OR     OPEN REDUCTION INTERNAL FIXATION WRIST  4/17/2014    Procedure: OPEN REDUCTION INTERNAL FIXATION WRIST;  Surgeon: Jessica Hayes MD;  Location:  OR     REMOVE HARDWARE WRIST  4/17/2014    Procedure: REMOVE HARDWARE WRIST;  Surgeon: Jessica Hayes MD;  Location:  OR     TARSORRHAPHY  1/29/2014    LE with upper lid weight   She is in the process of receiving additional orthopedic care.    Current Outpatient Prescriptions   Medication     Fish Oil-Cholecalciferol (FISH OIL + D3 PO)     Biotin 1 MG CAPS     acetaminophen (TYLENOL) 325 MG tablet     Hypromellose (ARTIFICIAL TEARS OP)     No current facility-administered medications for this visit.      Social History   Substance Use Topics     Smoking status: Never Smoker     Smokeless tobacco: Never Used     Alcohol use No     Family History   Problem Relation Age of Onset     Obesity Mother      Eye Disorder Brother      Depression Brother      Glaucoma No family hx of      Macular Degeneration No family hx of      Patient Supplied Answers to Review of Systems   ENT ROS 12/22/2017   Ears, Nose, Throat Hearing loss, Ringing/noise in ears   The remainder of the 10 point review of systems was negative.    Physical examination:  The patient appeared well and was in no acute distress.  She is breathing comfortably without stridor or stertor.  On cranial nerve examination, she has full extraocular motility and normal facial sensation.  She has a left upper eyelid weight in place.  There is modest synkinesis.  However she is able to activate all branches.  She is a grade 3 House-Brackman.   Symmetric at rest.  Left external auditory canal lined with healthy skin.  Tympanic membrane intact with no step-offs or concerns related to fracture.  Middle ear spaces clear.  The right ear similarly has normal appearance.    Audiograms: I reviewed the series of audiograms performed in [place] and there results are described in detail above.  There has been a progression from initially a primarily high-frequency sensorineural hearing loss to shift towards both low and high frequency loss to profound sensorineural hearing loss.    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  We discussed that this is often observed following trauma even in a delayed fashion and that we do not know how to prevent the delayed onset of this at this time.  As the patient is aware, this unfortunately is a permanent change in hearing.  I do not recommend additional intervention to the ear at this time.    We discussed strategies for aural rehabilitation.  I recommended that she undergo evaluation for a cross hearing aid versus BAHA.  We also discussed that there is interest in determining if patients with single-sided deafness benefit from cochlear implantation. I would like to review the MRI scan that she had done as part of the evaluation.  We may be able to visualize whether the cochlea has any patency.  Currently, cochlear implantation is not generally approved for single-sided hearing loss but this is not always the case.  We discussed that results can be mixed in that not all patients who can have a cochlear implant (meaning that they maintain cochlear patency) placed use the device when they have a normal hearing contralateral ear.  We provided a booklet on the different devices.    Mai and her mom both indicated that they would like to first start with the evaluation for the cross hearing  aid.  They will decide if they would like to pursue that here in [place] but it may be better to pursue this in [place] as she has excellent audiologic and otolaryngologic care there and could work with her local team to achieve the best results with the amplification.    Mena Galeana MD  Otology & Neurotology  [facility]    I spent a total of 30 minutes face-to-face with Mai Montana during today s office visit. Over 50% of this time was spent counseling the patient and/or coordinating care regarding progressive posttraumatic hearing loss.

## 2018-01-19 ENCOUNTER — CARE COORDINATION (OUTPATIENT)
Dept: OTOLARYNGOLOGY | Facility: CLINIC | Age: 23
End: 2018-01-19

## 2018-02-23 ENCOUNTER — OFFICE VISIT (OUTPATIENT)
Dept: ORTHOPEDICS | Facility: CLINIC | Age: 23
End: 2018-02-23
Payer: COMMERCIAL

## 2018-02-23 VITALS — HEIGHT: 68 IN | BODY MASS INDEX: 28.04 KG/M2 | RESPIRATION RATE: 16 BRPM | WEIGHT: 185 LBS

## 2018-02-23 DIAGNOSIS — Z98.890 S/P KNEE SURGERY: Primary | ICD-10-CM

## 2018-02-23 NOTE — LETTER
2018      RE: Mai Bianchi  [address] [address]       Service Date: 2018      CHIEF COMPLAINT:  Postoperative visit, left knee.      DATE OF SURGERY:  2017.      HISTORY OF PRESENT ILLNESS:  Mai Bianchi is a 22-year-old female who is now nearly 5 months status post left knee medial meniscus repair.  She had a displaced handle type tear.  Mai is doing well.  She ranks her knee as a 100.  She is back to very aggressive skiing.  She also does yoga.  She has no mechanical symptoms.  No pain or swelling.      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  She is an aggressive skier.      PHYSICAL EXAMINATION:  22-year-old female, alert and oriented, in no apparent distress.  Evaluation of the left knee reveals no effusion.  Range of motion 4 degrees of hyperextension to 140 degrees of flexion but this is symmetrical.  She has no medial joint line tenderness.  Excellent quadriceps bulk.      IMPRESSION:  Seven months status post left knee medial meniscus repair.  Doing well.  We had a nice discussion about activities.  I do not want her doing loaded deep flexion.  I think this should be a permanent restriction.  We discussed some modifications to yoga and weight training.      PLAN:   1.  Continue activities as tolerated other than the precautions given.   2.  Follow up with me in 2018 for a 1-year routine recheck.      I spent 15 minutes with this patient, 10 minutes dedicated to counseling, education and development of a treatment plan.         SURAJ PAYTON MD             D: 2018   T: 2018   MT: TAYO      Name:     MAI BIANCHI   MRN:      [id number]        Account:      [id number]   :      1995           Service Date: 2018      Document: [id number]

## 2018-02-23 NOTE — MR AVS SNAPSHOT
"              After Visit Summary   2018    Mai [place]    MRN: [id number]           Patient Information     Date Of Birth          1995        Visit Information        Provider Department      2018 11:30 AM Maynor Silvestre MD [facility]        Today's Diagnoses     S/P knee surgery    -  1       Follow-ups after your visit        Who to contact     Please call your clinic at [phone] to:    Ask questions about your health    Make or cancel appointments    Discuss your medicines    Learn about your test results    Speak to your doctor            Additional Information About Your Visit        MyChart Information     Hoblee is an electronic gateway that provides easy, online access to your medical records. With Hoblee, you can request a clinic appointment, read your test results, renew a prescription or communicate with your care team.     To sign up for Hoblee visit the website at [email or web address]   You will be asked to enter the access code listed below, as well as some personal information. Please follow the directions to create your username and password.     Your access code is: [email or web address]  Expires: 2018  8:47 AM     Your access code will  in 90 days. If you need help or a new code, please contact your [facility] or call [phone] for assistance.        Care EveryWhere ID     This is your Care EveryWhere ID. This could be used by other organizations to access your [place] medical records  [id number]        Your Vitals Were     Respirations Height BMI (Body Mass Index)             16 5' 8\" (1.727 m) 28.13 kg/m2          Blood Pressure from Last 3 Encounters:   17 94/59   14 100/48    Weight from Last 3 Encounters:   18 185 lb (83.9 kg)   17 185 lb 10 oz (84.2 kg) (96 %)*   14 169 lb (76.7 kg) (94 %)*     * Growth percentiles are based on CDC 2-20 Years data.            "   Today, you had the following     No orders found for display       Primary Care Provider Office Phone # Fax #    [facility] [phone] [address] [address]        Equal Access to Services     ABDULKADIR ROB : Hadii mercedez healy lore Sorobert, wayayada luqadaha, qaybta kaalmada annmarie, mina gallagher alice minor. So [facility] [phone].    ATENCIÓN: Si habla español, tiene a antony disposición servicios gratuitos de asistencia lingüística. Llame al [phone].    We comply with applicable federal civil rights laws and [place] laws. We do not discriminate on the basis of race, color, national origin, age, disability, sex, sexual orientation, or gender identity.            Thank you!     Thank you for choosing [facility]  for your care. Our goal is always to provide you with excellent care. Hearing back from our patients is one way we can continue to improve our services. Please take a few minutes to complete the written survey that you may receive in the mail after your visit with us. Thank you!             Your Updated Medication List - Protect others around you: Learn how to safely use, store and throw away your medicines at www.disposemymeds.org.          This list is accurate as of 2/23/18 11:59 PM.  Always use your most recent med list.                   Brand Name Dispense Instructions for use Diagnosis    acetaminophen 325 MG tablet    TYLENOL    100 tablet    Take 2 tablets (650 mg) by mouth every 4 hours as needed for other (mild pain)    Bucket-handle tear of medial meniscus of left knee as current injury, subsequent encounter       ARTIFICIAL TEARS OP           Biotin 1 MG Caps           FISH OIL + D3 PO

## 2018-02-26 NOTE — PROGRESS NOTES
Service Date: 2018      CHIEF COMPLAINT:  Postoperative visit, left knee.      DATE OF SURGERY:  2017.      HISTORY OF PRESENT ILLNESS:  Mai Bianchi is a 22-year-old female who is now nearly 5 months status post left knee medial meniscus repair.  She had a displaced handle type tear.  Mai is doing well.  She ranks her knee as a 100.  She is back to very aggressive skiing.  She also does yoga.  She has no mechanical symptoms.  No pain or swelling.      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      PHYSICAL EXAMINATION:  22-year-old female, alert and oriented, in no apparent distress.  Evaluation of the left knee reveals no effusion.  Range of motion 4 degrees of hyperextension to 140 degrees of flexion but this is symmetrical.  She has no medial joint line tenderness.  Excellent quadriceps bulk.      IMPRESSION:  Seven months status post left knee medial meniscus repair.  Doing well.  We had a nice discussion about activities.  I do not want her doing loaded deep flexion.  I think this should be a permanent restriction.  We discussed some modifications to yoga and weight training.      PLAN:   1.  Continue activities as tolerated other than the precautions given.   2.  Follow up with me in 2018 for a 1-year routine recheck.      I spent 15 minutes with this patient, 10 minutes dedicated to counseling, education and development of a treatment plan.         SURAJ PAYTON MD             D: 2018   T: 2018   MT: TAYO      Name:     MAI BIANCHI   MRN:      [id number]        Account:      [id number]   :      1995           Service Date: 2018      Document: [id number]

## 2019-08-15 NOTE — PROGRESS NOTES
Dr. Galeana Reviewed MRI.  At the time of the image, there was fluid in the cochlea.  I wanted to look at the images to see if they demonstrated if the cochlea is patent in case she would like to consider a cochlear implant instead of a CROS hearing aid or BAHA.   From these images, we can say that at least part of the cochlea is patent.  There are also no other new findings to raise concerns that the hearing loss is from anything other than the temporal bone fracture.  We will support whatever decision she makes about aural rehab and can offer to set up any appointments at her discretion (CROS, BAHA, and/or CI evaluations).  Above information was left on home phone #  Jania Rose RN  ENT Care Coordinator   Otology  [phone]  1/19/2018 2:55 PM    
Detail Level: Detailed
Otc Regimen: Rec CeraVe SA

## 2024-07-23 ENCOUNTER — APPOINTMENT (RX ONLY)
Dept: URBAN - NONMETROPOLITAN AREA CLINIC 27 | Facility: CLINIC | Age: 29
Setting detail: DERMATOLOGY
End: 2024-07-23

## 2024-07-23 DIAGNOSIS — L71.0 PERIORAL DERMATITIS: ICD-10-CM

## 2024-07-23 DIAGNOSIS — Z71.89 OTHER SPECIFIED COUNSELING: ICD-10-CM

## 2024-07-23 DIAGNOSIS — D22 MELANOCYTIC NEVI: ICD-10-CM

## 2024-07-23 DIAGNOSIS — L21.8 OTHER SEBORRHEIC DERMATITIS: ICD-10-CM

## 2024-07-23 DIAGNOSIS — L81.4 OTHER MELANIN HYPERPIGMENTATION: ICD-10-CM

## 2024-07-23 DIAGNOSIS — Z80.8 FAMILY HISTORY OF MALIGNANT NEOPLASM OF OTHER ORGANS OR SYSTEMS: ICD-10-CM

## 2024-07-23 DIAGNOSIS — L60.3 NAIL DYSTROPHY: ICD-10-CM

## 2024-07-23 DIAGNOSIS — L90.5 SCAR CONDITIONS AND FIBROSIS OF SKIN: ICD-10-CM

## 2024-07-23 PROBLEM — D22.5 MELANOCYTIC NEVI OF TRUNK: Status: ACTIVE | Noted: 2024-07-23

## 2024-07-23 PROCEDURE — ? SUNSCREEN RECOMMENDATIONS

## 2024-07-23 PROCEDURE — ? PATIENT SPECIFIC COUNSELING

## 2024-07-23 PROCEDURE — ? COUNSELING

## 2024-07-23 PROCEDURE — 99203 OFFICE O/P NEW LOW 30 MIN: CPT

## 2024-07-23 ASSESSMENT — LOCATION DETAILED DESCRIPTION DERM
LOCATION DETAILED: RIGHT PROXIMAL POSTERIOR UPPER ARM
LOCATION DETAILED: LEFT POSTERIOR LATERAL PROXIMAL THIGH
LOCATION DETAILED: LEFT PROXIMAL POSTERIOR UPPER ARM
LOCATION DETAILED: RIGHT BUTTOCK
LOCATION DETAILED: LEFT MID-UPPER BACK
LOCATION DETAILED: RIGHT SUPERIOR UPPER BACK
LOCATION DETAILED: RIGHT INDEX FINGERNAIL
LOCATION DETAILED: LEFT POSTERIOR LATERAL DISTAL THIGH

## 2024-07-23 ASSESSMENT — LOCATION ZONE DERM
LOCATION ZONE: FINGERNAIL
LOCATION ZONE: TRUNK
LOCATION ZONE: LEG
LOCATION ZONE: ARM

## 2024-07-23 ASSESSMENT — LOCATION SIMPLE DESCRIPTION DERM
LOCATION SIMPLE: RIGHT UPPER BACK
LOCATION SIMPLE: RIGHT INDEX FINGERNAIL
LOCATION SIMPLE: LEFT UPPER BACK
LOCATION SIMPLE: RIGHT BUTTOCK
LOCATION SIMPLE: LEFT THIGH
LOCATION SIMPLE: LEFT POSTERIOR UPPER ARM
LOCATION SIMPLE: RIGHT POSTERIOR UPPER ARM

## 2024-07-23 NOTE — PROCEDURE: PATIENT SPECIFIC COUNSELING
Detail Level: Zone
ABCs of skin care reviewed: \\nretina\\nSun block\\nVitamin c
Not present today. Discussed protecting barrier with moisturizers and sunscreens. If area flares and does not improve may do oral antibiotic or a topical
Not present today but has flares around change of seasons\\n\\nRecommend Neutrogena Tgel increase frequency of washing hair
Patient has a habit of picking at lateral and proximal nail folds.
Due to accident.

## (undated) DEVICE — SUCTION MANIFOLD DORNOCH ULTRA CART UL-CL500

## (undated) DEVICE — CAST PADDING 6" STERILE 9046S

## (undated) DEVICE — LINEN TOWEL PACK X5 5464

## (undated) DEVICE — SYR BULB IRRIG 50ML LATEX FREE 0035280

## (undated) DEVICE — BLADE KNIFE SURG 10 371110

## (undated) DEVICE — ESU GROUND PAD ADULT W/CORD E7507

## (undated) DEVICE — SU MONOCRYL 3-0 PS-2 18" UND Y497G

## (undated) DEVICE — GLOVE PROTEXIS POWDER FREE 7.0 ORTHOPEDIC 2D73ET70

## (undated) DEVICE — ESU PENCIL W/HOLSTER

## (undated) DEVICE — LINEN DRAPE 54X72" 5467

## (undated) DEVICE — DRAPE U-DRAPE 1015NSD NON-STERILE

## (undated) DEVICE — Device

## (undated) DEVICE — BNDG TUBIGRIP G LATEX 1453

## (undated) DEVICE — SU MONOCRYL 2-0 SH 27" UND Y417H

## (undated) DEVICE — BLADE SHAVER ARTHRO 4.2MM FULL RADIUS 9247A

## (undated) DEVICE — SOL NACL 0.9% IRRIG 3000ML BAG 2B7477

## (undated) DEVICE — COVER CAMERA IN-LIGHT DISP LT-C02

## (undated) DEVICE — DRSG STERI STRIP 1/2X4" R1547

## (undated) DEVICE — STRAP KNEE/BODY 31143004

## (undated) DEVICE — ARTHROSCOPIC CANNULA 5.5X70MM GRAY  9718

## (undated) DEVICE — GLOVE PROTEXIS W/NEU-THERA 7.0  2D73TE70

## (undated) DEVICE — NDL 18GA 1.5" 305196

## (undated) DEVICE — SU ETHILON 3-0 PS-1 18" 1663H

## (undated) DEVICE — GLOVE PROTEXIS W/NEU-THERA 7.5  2D73TE75

## (undated) DEVICE — SYR 10ML FINGER CONTROL W/O NDL 309695

## (undated) DEVICE — TUBING ARTHRO CONMED/LINVATEC PUMP BLUE INFLOW 10K100

## (undated) RX ORDER — FENTANYL CITRATE 50 UG/ML
INJECTION, SOLUTION INTRAMUSCULAR; INTRAVENOUS
Status: DISPENSED
Start: 2017-08-09

## (undated) RX ORDER — LIDOCAINE HYDROCHLORIDE 20 MG/ML
INJECTION, SOLUTION EPIDURAL; INFILTRATION; INTRACAUDAL; PERINEURAL
Status: DISPENSED
Start: 2017-08-09

## (undated) RX ORDER — CEFAZOLIN SODIUM 2 G/100ML
INJECTION, SOLUTION INTRAVENOUS
Status: DISPENSED
Start: 2017-08-09

## (undated) RX ORDER — KETOROLAC TROMETHAMINE 30 MG/ML
INJECTION, SOLUTION INTRAMUSCULAR; INTRAVENOUS
Status: DISPENSED
Start: 2017-08-09

## (undated) RX ORDER — BUPIVACAINE HYDROCHLORIDE AND EPINEPHRINE 2.5; 5 MG/ML; UG/ML
INJECTION, SOLUTION EPIDURAL; INFILTRATION; INTRACAUDAL; PERINEURAL
Status: DISPENSED
Start: 2017-08-09

## (undated) RX ORDER — PROPOFOL 10 MG/ML
INJECTION, EMULSION INTRAVENOUS
Status: DISPENSED
Start: 2017-08-09

## (undated) RX ORDER — ONDANSETRON 2 MG/ML
INJECTION INTRAMUSCULAR; INTRAVENOUS
Status: DISPENSED
Start: 2017-08-09

## (undated) RX ORDER — DEXAMETHASONE SODIUM PHOSPHATE 4 MG/ML
INJECTION, SOLUTION INTRA-ARTICULAR; INTRALESIONAL; INTRAMUSCULAR; INTRAVENOUS; SOFT TISSUE
Status: DISPENSED
Start: 2017-08-09

## (undated) RX ORDER — GLYCOPYRROLATE 0.2 MG/ML
INJECTION, SOLUTION INTRAMUSCULAR; INTRAVENOUS
Status: DISPENSED
Start: 2017-08-09